# Patient Record
Sex: MALE | Race: WHITE | NOT HISPANIC OR LATINO | URBAN - METROPOLITAN AREA
[De-identification: names, ages, dates, MRNs, and addresses within clinical notes are randomized per-mention and may not be internally consistent; named-entity substitution may affect disease eponyms.]

---

## 2017-03-27 ENCOUNTER — OUTPATIENT (OUTPATIENT)
Dept: OUTPATIENT SERVICES | Facility: HOSPITAL | Age: 57
LOS: 1 days | Discharge: HOME | End: 2017-03-27

## 2017-06-27 DIAGNOSIS — R74.8 ABNORMAL LEVELS OF OTHER SERUM ENZYMES: ICD-10-CM

## 2017-06-27 DIAGNOSIS — E78.2 MIXED HYPERLIPIDEMIA: ICD-10-CM

## 2018-01-05 ENCOUNTER — OUTPATIENT (OUTPATIENT)
Dept: OUTPATIENT SERVICES | Facility: HOSPITAL | Age: 58
LOS: 1 days | Discharge: HOME | End: 2018-01-05

## 2018-01-05 DIAGNOSIS — D64.9 ANEMIA, UNSPECIFIED: ICD-10-CM

## 2018-01-05 DIAGNOSIS — N40.0 BENIGN PROSTATIC HYPERPLASIA WITHOUT LOWER URINARY TRACT SYMPTOMS: ICD-10-CM

## 2018-01-05 DIAGNOSIS — R79.9 ABNORMAL FINDING OF BLOOD CHEMISTRY, UNSPECIFIED: ICD-10-CM

## 2018-01-05 DIAGNOSIS — E11.9 TYPE 2 DIABETES MELLITUS WITHOUT COMPLICATIONS: ICD-10-CM

## 2018-01-05 DIAGNOSIS — E03.9 HYPOTHYROIDISM, UNSPECIFIED: ICD-10-CM

## 2018-01-05 DIAGNOSIS — E78.2 MIXED HYPERLIPIDEMIA: ICD-10-CM

## 2018-03-02 ENCOUNTER — OUTPATIENT (OUTPATIENT)
Dept: OUTPATIENT SERVICES | Facility: HOSPITAL | Age: 58
LOS: 1 days | Discharge: HOME | End: 2018-03-02

## 2018-03-03 DIAGNOSIS — D64.9 ANEMIA, UNSPECIFIED: ICD-10-CM

## 2018-03-03 DIAGNOSIS — E78.2 MIXED HYPERLIPIDEMIA: ICD-10-CM

## 2018-03-03 DIAGNOSIS — E11.9 TYPE 2 DIABETES MELLITUS WITHOUT COMPLICATIONS: ICD-10-CM

## 2018-05-18 ENCOUNTER — OUTPATIENT (OUTPATIENT)
Dept: OUTPATIENT SERVICES | Facility: HOSPITAL | Age: 58
LOS: 1 days | Discharge: HOME | End: 2018-05-18

## 2018-05-18 DIAGNOSIS — E11.9 TYPE 2 DIABETES MELLITUS WITHOUT COMPLICATIONS: ICD-10-CM

## 2018-06-19 ENCOUNTER — OUTPATIENT (OUTPATIENT)
Dept: OUTPATIENT SERVICES | Facility: HOSPITAL | Age: 58
LOS: 1 days | Discharge: HOME | End: 2018-06-19

## 2018-06-19 DIAGNOSIS — E78.2 MIXED HYPERLIPIDEMIA: ICD-10-CM

## 2018-06-19 DIAGNOSIS — E11.9 TYPE 2 DIABETES MELLITUS WITHOUT COMPLICATIONS: ICD-10-CM

## 2018-06-22 ENCOUNTER — OUTPATIENT (OUTPATIENT)
Dept: OUTPATIENT SERVICES | Facility: HOSPITAL | Age: 58
LOS: 1 days | Discharge: HOME | End: 2018-06-22

## 2018-06-22 DIAGNOSIS — R76.11 NONSPECIFIC REACTION TO TUBERCULIN SKIN TEST WITHOUT ACTIVE TUBERCULOSIS: ICD-10-CM

## 2018-06-22 DIAGNOSIS — I10 ESSENTIAL (PRIMARY) HYPERTENSION: ICD-10-CM

## 2018-06-22 DIAGNOSIS — K75.2 NONSPECIFIC REACTIVE HEPATITIS: ICD-10-CM

## 2018-06-22 DIAGNOSIS — Z20.2 CONTACT WITH AND (SUSPECTED) EXPOSURE TO INFECTIONS WITH A PREDOMINANTLY SEXUAL MODE OF TRANSMISSION: ICD-10-CM

## 2019-06-10 ENCOUNTER — OUTPATIENT (OUTPATIENT)
Dept: OUTPATIENT SERVICES | Facility: HOSPITAL | Age: 59
LOS: 1 days | Discharge: HOME | End: 2019-06-10
Payer: COMMERCIAL

## 2019-06-10 DIAGNOSIS — M25.511 PAIN IN RIGHT SHOULDER: ICD-10-CM

## 2019-06-10 DIAGNOSIS — M25.512 PAIN IN LEFT SHOULDER: ICD-10-CM

## 2019-06-10 PROCEDURE — 73030 X-RAY EXAM OF SHOULDER: CPT | Mod: 26,50

## 2019-07-02 ENCOUNTER — OUTPATIENT (OUTPATIENT)
Dept: OUTPATIENT SERVICES | Facility: HOSPITAL | Age: 59
LOS: 1 days | Discharge: HOME | End: 2019-07-02

## 2019-07-02 DIAGNOSIS — L40.0 PSORIASIS VULGARIS: ICD-10-CM

## 2023-08-12 ENCOUNTER — EMERGENCY (EMERGENCY)
Facility: HOSPITAL | Age: 63
LOS: 0 days | Discharge: HOME | End: 2023-08-12
Attending: EMERGENCY MEDICINE
Payer: COMMERCIAL

## 2023-08-12 VITALS
HEART RATE: 81 BPM | DIASTOLIC BLOOD PRESSURE: 95 MMHG | OXYGEN SATURATION: 99 % | WEIGHT: 225.09 LBS | RESPIRATION RATE: 16 BRPM | HEIGHT: 69 IN | TEMPERATURE: 98 F | SYSTOLIC BLOOD PRESSURE: 198 MMHG

## 2023-08-12 DIAGNOSIS — N50.811 RIGHT TESTICULAR PAIN: ICD-10-CM

## 2023-08-12 DIAGNOSIS — E78.5 HYPERLIPIDEMIA, UNSPECIFIED: ICD-10-CM

## 2023-08-12 DIAGNOSIS — N45.1 EPIDIDYMITIS: ICD-10-CM

## 2023-08-12 DIAGNOSIS — R73.03 PREDIABETES: ICD-10-CM

## 2023-08-12 LAB
ALBUMIN SERPL ELPH-MCNC: 5.2 G/DL — SIGNIFICANT CHANGE UP (ref 3.5–5.2)
ALP SERPL-CCNC: 80 U/L — SIGNIFICANT CHANGE UP (ref 30–115)
ALT FLD-CCNC: 24 U/L — SIGNIFICANT CHANGE UP (ref 0–41)
ANION GAP SERPL CALC-SCNC: 14 MMOL/L — SIGNIFICANT CHANGE UP (ref 7–14)
APPEARANCE UR: ABNORMAL
AST SERPL-CCNC: 33 U/L — SIGNIFICANT CHANGE UP (ref 0–41)
BASOPHILS # BLD AUTO: 0.08 K/UL — SIGNIFICANT CHANGE UP (ref 0–0.2)
BASOPHILS NFR BLD AUTO: 0.7 % — SIGNIFICANT CHANGE UP (ref 0–1)
BILIRUB SERPL-MCNC: 1.1 MG/DL — SIGNIFICANT CHANGE UP (ref 0.2–1.2)
BILIRUB UR-MCNC: NEGATIVE — SIGNIFICANT CHANGE UP
BUN SERPL-MCNC: 13 MG/DL — SIGNIFICANT CHANGE UP (ref 10–20)
CALCIUM SERPL-MCNC: 10.1 MG/DL — SIGNIFICANT CHANGE UP (ref 8.4–10.5)
CHLORIDE SERPL-SCNC: 100 MMOL/L — SIGNIFICANT CHANGE UP (ref 98–110)
CO2 SERPL-SCNC: 24 MMOL/L — SIGNIFICANT CHANGE UP (ref 17–32)
COLOR SPEC: YELLOW — SIGNIFICANT CHANGE UP
CREAT SERPL-MCNC: 0.8 MG/DL — SIGNIFICANT CHANGE UP (ref 0.7–1.5)
DIFF PNL FLD: ABNORMAL
EGFR: 101 ML/MIN/1.73M2 — SIGNIFICANT CHANGE UP
EOSINOPHIL # BLD AUTO: 0.18 K/UL — SIGNIFICANT CHANGE UP (ref 0–0.7)
EOSINOPHIL NFR BLD AUTO: 1.5 % — SIGNIFICANT CHANGE UP (ref 0–8)
GLUCOSE SERPL-MCNC: 114 MG/DL — HIGH (ref 70–99)
GLUCOSE UR QL: NEGATIVE MG/DL — SIGNIFICANT CHANGE UP
HCT VFR BLD CALC: 43.9 % — SIGNIFICANT CHANGE UP (ref 42–52)
HGB BLD-MCNC: 15 G/DL — SIGNIFICANT CHANGE UP (ref 14–18)
IMM GRANULOCYTES NFR BLD AUTO: 0.6 % — HIGH (ref 0.1–0.3)
KETONES UR-MCNC: ABNORMAL MG/DL
LACTATE SERPL-SCNC: 1.8 MMOL/L — SIGNIFICANT CHANGE UP (ref 0.7–2)
LEUKOCYTE ESTERASE UR-ACNC: ABNORMAL
LYMPHOCYTES # BLD AUTO: 25.7 % — SIGNIFICANT CHANGE UP (ref 20.5–51.1)
LYMPHOCYTES # BLD AUTO: 3.11 K/UL — SIGNIFICANT CHANGE UP (ref 1.2–3.4)
MCHC RBC-ENTMCNC: 32.5 PG — HIGH (ref 27–31)
MCHC RBC-ENTMCNC: 34.2 G/DL — SIGNIFICANT CHANGE UP (ref 32–37)
MCV RBC AUTO: 95.2 FL — HIGH (ref 80–94)
MONOCYTES # BLD AUTO: 1.13 K/UL — HIGH (ref 0.1–0.6)
MONOCYTES NFR BLD AUTO: 9.3 % — SIGNIFICANT CHANGE UP (ref 1.7–9.3)
NEUTROPHILS # BLD AUTO: 7.53 K/UL — HIGH (ref 1.4–6.5)
NEUTROPHILS NFR BLD AUTO: 62.2 % — SIGNIFICANT CHANGE UP (ref 42.2–75.2)
NITRITE UR-MCNC: POSITIVE
NRBC # BLD: 0 /100 WBCS — SIGNIFICANT CHANGE UP (ref 0–0)
PH UR: 6 — SIGNIFICANT CHANGE UP (ref 5–8)
PLATELET # BLD AUTO: 292 K/UL — SIGNIFICANT CHANGE UP (ref 130–400)
PMV BLD: 9.8 FL — SIGNIFICANT CHANGE UP (ref 7.4–10.4)
POTASSIUM SERPL-MCNC: 4 MMOL/L — SIGNIFICANT CHANGE UP (ref 3.5–5)
POTASSIUM SERPL-SCNC: 4 MMOL/L — SIGNIFICANT CHANGE UP (ref 3.5–5)
PROT SERPL-MCNC: 8.3 G/DL — HIGH (ref 6–8)
PROT UR-MCNC: 30 MG/DL
RBC # BLD: 4.61 M/UL — LOW (ref 4.7–6.1)
RBC # FLD: 12.4 % — SIGNIFICANT CHANGE UP (ref 11.5–14.5)
SODIUM SERPL-SCNC: 138 MMOL/L — SIGNIFICANT CHANGE UP (ref 135–146)
SP GR SPEC: 1.02 — SIGNIFICANT CHANGE UP (ref 1–1.03)
UROBILINOGEN FLD QL: 1 MG/DL — SIGNIFICANT CHANGE UP (ref 0.2–1)
WBC # BLD: 12.1 K/UL — HIGH (ref 4.8–10.8)
WBC # FLD AUTO: 12.1 K/UL — HIGH (ref 4.8–10.8)

## 2023-08-12 PROCEDURE — 87186 SC STD MICRODIL/AGAR DIL: CPT

## 2023-08-12 PROCEDURE — 81001 URINALYSIS AUTO W/SCOPE: CPT

## 2023-08-12 PROCEDURE — 87086 URINE CULTURE/COLONY COUNT: CPT

## 2023-08-12 PROCEDURE — 76870 US EXAM SCROTUM: CPT | Mod: 26

## 2023-08-12 PROCEDURE — 36415 COLL VENOUS BLD VENIPUNCTURE: CPT

## 2023-08-12 PROCEDURE — 83605 ASSAY OF LACTIC ACID: CPT

## 2023-08-12 PROCEDURE — 76870 US EXAM SCROTUM: CPT

## 2023-08-12 PROCEDURE — 99284 EMERGENCY DEPT VISIT MOD MDM: CPT

## 2023-08-12 PROCEDURE — 85025 COMPLETE CBC W/AUTO DIFF WBC: CPT

## 2023-08-12 PROCEDURE — 80053 COMPREHEN METABOLIC PANEL: CPT

## 2023-08-12 PROCEDURE — 99284 EMERGENCY DEPT VISIT MOD MDM: CPT | Mod: 25

## 2023-08-12 RX ORDER — LEVOFLOXACIN 5 MG/ML
1 INJECTION, SOLUTION INTRAVENOUS
Qty: 9 | Refills: 0
Start: 2023-08-12 | End: 2023-08-20

## 2023-08-12 NOTE — ED PROVIDER NOTE - NSFOLLOWUPCLINICS_GEN_ALL_ED_FT
[facility]  Urology  .  [place]   Phone: [phone]  Fax:

## 2023-08-12 NOTE — ED PROVIDER NOTE - PHYSICAL EXAMINATION
Physical Exam    Constitutional: No acute distress.   Eyes: Conjunctiva pink, Sclera clear, PERRLA, EOMI.  ENT: No sinus tenderness. No nasal discharge. No oropharyngeal erythema, edema, or exudates. Uvula midline.   Cardiovascular: Regular rate, regular rhythm. No noted murmurs rubs or gallops.  Respiratory: unlabored respiratory effort, clear to auscultation bilaterally no wheezing, rales or rhonchi  Gastrointestinal: Normal bowel sounds. soft, non distended, non-tender abdomen.   : Right sided testicular enlargement mild tenderness. No lesions or discharge  Musculoskeletal: supple neck, no midline tenderness. No joint or bony deformity.   Integumentary: warm, dry, no rash  Neurologic: awake, alert, cranial nerves II-XII grossly intact, extremities’ motor and sensory functions grossly intact  Psychiatric: appropriate mood, appropriate affect

## 2023-08-12 NOTE — ED PROVIDER NOTE - PATIENT PORTAL LINK FT
You can access the FollowMyHealth Patient Portal offered by [facility] by registering at the following website: [email or web address]. By joining [facility]’s FollowMyHealth portal, you will also be able to view your health information using other applications (apps) compatible with our system.

## 2023-08-12 NOTE — ED PROVIDER NOTE - NSFOLLOWUPINSTRUCTIONS_ED_ALL_ED_FT
Please take Levaquin 500mg every 24 hours for 10 days total. Please follow up with Urology in 1 week.     Our Emergency Department Referral Coordinators will be reaching out to you in the next 24-48 hours from 9:00am to 5:00pm with a follow up appointment. Please expect a phone call from the hospital in that time frame. If you do not receive a call or if you have any questions or concerns, you can reach them at [phone] or [phone].     WHAT YOU NEED TO KNOW:    What is epididymo-orchitis? Epididymo-orchitis is inflammation of your epididymis and testicle. The epididymis is a coiled tube inside your scrotum. It stores and carries sperm from your testicles to your penis. Epididymo-orchitis usually affects the epididymis and testicle on one side, but it may affect both sides.     What causes epididymo-orchitis?   •A urinary tract infection (UTI) or mumps virus infection that spreads to the epididymis   •Trauma or injury of the testes   •Urine that flows backward from your urethra to the epididymis  •Sexually transmitted infections (STIs) such as gonorrhea or Chlamydia  •Use of heart medicine called amiodarone    What are the signs and symptoms of epididymo-orchitis?   •Pain or tenderness in your scrotum, abdomen, or groin  •Redness or swelling of your scrotum  •Pain or burning during urination, or frequent urination  •Discharge from your penis or blood in your urine or semen  •Fever    How is epididymo-orchitis diagnosed? Your healthcare provider may examine your penis, prostate, and scrotum. He may ask about your symptoms and any health conditions you have. You may need any of the following tests:   •Blood and urine tests may be done to check for infection. If you have discharge, a small amount of this fluid will be tested for bacteria.   •An ultrasound uses sound waves to show pictures of your testicles on a monitor. An ultrasound may be used to check blood flow to your testicles.   •A nuclear scan checks the blood flow in your testicles. A small amount of radioactive material may be injected into your blood. The radioactive material helps your blood vessels show up better.    How is epididymo-orchitis treated? Treatment depends on the cause of your epididymo-orchitis and may include any of the following:   •Antibiotics help treat a bacterial infection.   •NSAIDs, such as ibuprofen, help decrease swelling, pain, and fever. This medicine is available with or without a doctor's order. NSAIDs can cause stomach bleeding or kidney problems in certain people. If you take blood thinner medicine, always ask if NSAIDs are safe for you. Always read the medicine label and follow directions. Do not give these medicines to children under 6 months of age without direction from your child's healthcare provider.  •Acetaminophen decreases pain and fever. It is available without a doctor's order. Ask how much to take and how often to take it. Follow directions. Acetaminophen can cause liver damage if not taken correctly.  •Prescription pain medicine may be given. Ask how to take this medicine safely.  •Surgery may be needed if your condition gets worse. Surgery to drain an abscess (collection of pus) may be needed. Surgery to remove part or all of your epididymis or testicle may also be done.     How can I manage epididymo-orchitis?   •Apply ice on your testicles for 15 to 20 minutes every hour or as directed. Use an ice pack, or put crushed ice in a plastic bag. Cover it with a towel. Ice helps prevent tissue damage and decreases swelling and pain.  •Rest in bed as directed. Elevate your scrotum when you sit or lie down to help reduce swelling and pain. You may be asked to do this by placing a rolled-up towel under your scrotum.  •Scrotal support may be recommended. An athletic supporter provides scrotal support and may make you more comfortable when you stand. Ask your healthcare provider how to use an athletic supporter.   •Do not lift heavy objects. You can make swelling worse if you lift heavy objects or strain.     When should I seek immediate care?   •You have severe pain in your testicles.  •Your symptoms become worse even after you start treatment with medicine.    When should I contact my healthcare provider?   •Your symptoms do not get better within 3 days of treatment or come back after treatment.  •You have a hot, red, tender area on your testicles.  •You have questions or concerns about your condition or care.    CARE AGREEMENT:    You have the right to help plan your care. Learn about your health condition and how it may be treated. Discuss treatment options with your healthcare providers to decide what care you want to receive. You always have the right to refuse treatment.

## 2023-08-12 NOTE — ED PROVIDER NOTE - OBJECTIVE STATEMENT
63-year-old male with past medical history of hypertriglyceridemia and prediabetes presents to the ED with testicular pain.  Patient developed atraumatic right testicular pain last night which he noticed after going to the beach.  He admits to some tenderness and foul-smelling urine for the past week but denies any genital lesions, penile discharge, dysuria, hematuria, perianal pain.  He is monogamous with his wife of many years and is not concerned about STIs. Denies fever, chills, chest pain, shortness of breath, abdominal pain, nausea, vomiting, diarrhea, constipation, lower extremity swelling, rash.

## 2023-08-12 NOTE — ED PROVIDER NOTE - NS ED ATTENDING STATEMENT MOD
This was a shared visit with the MERLENE. I reviewed and verified the documentation and independently performed the documented:

## 2023-08-12 NOTE — ED PROVIDER NOTE - ATTENDING APP SHARED VISIT CONTRIBUTION OF CARE
62 yo m with pmh of hld and predm presents with R testicular pain after coming home from the beach.  noted some malodorous urine for the past week.  pt denies trauma to the genitals.  denies dysuria or hematuria.  pt says noted some discomfort a few days at ago in the R testicle but no yenni pain.  denies h/o stds or concern for stds.  monogamous with wife.  no n/v/d, abd pain, flank pain, fever or chills.  exam: nad, ncat, perrl, eomi, mmm, rrr, ctab, abd soft, nt, nd, R testicular swelling and ttp, no rash, normal lie, normal cremasteric reflex b/l, aox3, imp: pt with R testicular pain and swelling, r/o torsion v epididymalorchitis, labs, ua, us

## 2023-08-12 NOTE — ED PROVIDER NOTE - CLINICAL SUMMARY MEDICAL DECISION MAKING FREE TEXT BOX
pt with R testicular pain and swelling, found to have epididymitis, will start on abx and f/u with uro outpt.  Pt was given strict return to ED precautions and pt indicated that he/she understood. Any ordered labs and EKG were reviewed.  Any imaging was ordered and reviewed by me.  Appropriate medications for patient's presenting complaints were ordered and effects were reassessed.  Patient's records (prior hospital, ED visit, and/or nursing home notes if available) were reviewed.  Additional history was obtained from EMS, family, and/or PCP (where available).  Escalation to admission/observation was considered.  1) However patient feels much better and is comfortable with discharge.  Appropriate follow-up was arranged.

## 2023-08-12 NOTE — ED ADULT TRIAGE NOTE - NS ED TRIAGE AVPU SCALE
PATIENT CALLED, VOICED SOME CONCERN AND FRUSTRATION.  HAD SOME ISSUES WITH VIDEO VISITS DURING FIRST PART OF PANDEMIC, THEN KEPT SPEAKING TO OFFICE STAFF AND GETTING TOLD THAT HE WOULD GET A CALL BACK FROM PROVIDER WHICH NEVER HAPPENED.  (DOESN'T LOOK LIKE ANY MESSAGES WERE LEFT)SO HE IS NOW CALLING BECAUSE HE DOESN'T KNOW WHAT TO DO ABOUT HIS HANDS.  STATES HIS STRENGTH IS NOT COMING BACK IN EITHER HAND AND DOESN'T KNOW IF THIS IS NORMAL OR IF HE NEEDS TO COME IN OR IF HE NEEDS O.T. WOULD REALLY LIKE TO SPEAK TO THE DOCTOR OR DIANA TO DISCUSS THIS TODAY.  PLEASE CALL [phone] [place]   Alert-The patient is alert, awake and responds to voice. The patient is oriented to time, place, and person. The triage nurse is able to obtain subjective information.

## 2023-08-12 NOTE — ED ADULT NURSE NOTE - NSFALLUNIVINTERV_ED_ALL_ED
Bed/Stretcher in lowest position, wheels locked, appropriate side rails in place/Call bell, personal items and telephone in reach/Instruct patient to call for assistance before getting out of bed/chair/stretcher/Non-slip footwear applied when patient is off stretcher/[place] to call system/Physically safe environment - no spills, clutter or unnecessary equipment/Purposeful proactive rounding/Room/bathroom lighting operational, light cord in reach

## 2023-08-12 NOTE — ED ADULT NURSE NOTE - ED CARDIAC CAPILLARY REFILL
"Requested Prescriptions   Pending Prescriptions Disp Refills     cyanocobalamin (CYANOCOBALAMIN) 1000 MCG/ML injection [Pharmacy Med Name: Cyanocobalamin 1000 MCG/ML Injection Solution]    Last Written Prescription Date:  02/07/2020  Last Fill Quantity: 1 mL,  # refills: 0   Last office visit: 1/24/2020 with prescribing provider:  PAVAN Fatima   Future Office Visit:     1 mL 0     Sig: INJECT 1 ML (1,000 MCG) SUBCUTANEOUSLY EVERY 30 DAYS       Vitamin Supplements (Adult) Protocol Passed - 3/7/2020 11:14 AM        Passed - High dose Vitamin D not ordered        Passed - Recent (12 mo) or future (30 days) visit within the authorizing provider's specialty     Patient has had an office visit with the authorizing provider or a provider within the authorizing providers department within the previous 12 mos or has a future within next 30 days. See \"Patient Info\" tab in inbasket, or \"Choose Columns\" in Meds & Orders section of the refill encounter.              Passed - Medication is active on med list           " 2 seconds or less

## 2024-09-21 ENCOUNTER — INPATIENT (INPATIENT)
Facility: HOSPITAL | Age: 64
LOS: 2 days | Discharge: HOME | DRG: 86 | End: 2024-09-24
Attending: SURGERY | Admitting: SURGERY
Payer: COMMERCIAL

## 2024-09-21 VITALS
HEART RATE: 95 BPM | SYSTOLIC BLOOD PRESSURE: 159 MMHG | TEMPERATURE: 99 F | RESPIRATION RATE: 18 BRPM | DIASTOLIC BLOOD PRESSURE: 88 MMHG | OXYGEN SATURATION: 100 %

## 2024-09-21 DIAGNOSIS — I60.9 NONTRAUMATIC SUBARACHNOID HEMORRHAGE, UNSPECIFIED: ICD-10-CM

## 2024-09-21 DIAGNOSIS — S02.80XA FRACTURE OF OTHER SPECIFIED SKULL AND FACIAL BONES, UNSPECIFIED SIDE, INITIAL ENCOUNTER FOR CLOSED FRACTURE: ICD-10-CM

## 2024-09-21 LAB
ALBUMIN SERPL ELPH-MCNC: 4.7 G/DL — SIGNIFICANT CHANGE UP (ref 3.5–5.2)
ALP SERPL-CCNC: 76 U/L — SIGNIFICANT CHANGE UP (ref 30–115)
ALT FLD-CCNC: 29 U/L — SIGNIFICANT CHANGE UP (ref 0–41)
ANION GAP SERPL CALC-SCNC: 18 MMOL/L — HIGH (ref 7–14)
APTT BLD: 33.1 SEC — SIGNIFICANT CHANGE UP (ref 27–39.2)
APTT BLD: 41.9 SEC — HIGH (ref 27–39.2)
AST SERPL-CCNC: 44 U/L — HIGH (ref 0–41)
B-OH-BUTYR SERPL-SCNC: 0.4 MMOL/L — SIGNIFICANT CHANGE UP
BASE EXCESS BLDV CALC-SCNC: -3.3 MMOL/L — LOW (ref -2–3)
BASE EXCESS BLDV CALC-SCNC: -3.5 MMOL/L — LOW (ref -2–3)
BASOPHILS # BLD AUTO: 0.31 K/UL — HIGH (ref 0–0.2)
BASOPHILS NFR BLD AUTO: 2.6 % — HIGH (ref 0–1)
BILIRUB SERPL-MCNC: 0.5 MG/DL — SIGNIFICANT CHANGE UP (ref 0.2–1.2)
BUN SERPL-MCNC: 12 MG/DL — SIGNIFICANT CHANGE UP (ref 10–20)
CA-I SERPL-SCNC: 1.22 MMOL/L — SIGNIFICANT CHANGE UP (ref 1.15–1.33)
CALCIUM SERPL-MCNC: 9 MG/DL — SIGNIFICANT CHANGE UP (ref 8.4–10.5)
CHLORIDE SERPL-SCNC: 100 MMOL/L — SIGNIFICANT CHANGE UP (ref 98–110)
CK SERPL-CCNC: 87 U/L — SIGNIFICANT CHANGE UP (ref 0–225)
CO2 SERPL-SCNC: 16 MMOL/L — LOW (ref 17–32)
CREAT SERPL-MCNC: 0.9 MG/DL — SIGNIFICANT CHANGE UP (ref 0.7–1.5)
EGFR: 97 ML/MIN/1.73M2 — SIGNIFICANT CHANGE UP
EOSINOPHIL # BLD AUTO: 0.72 K/UL — HIGH (ref 0–0.7)
EOSINOPHIL NFR BLD AUTO: 6.1 % — SIGNIFICANT CHANGE UP (ref 0–8)
ETHANOL SERPL-MCNC: 228 MG/DL — HIGH
GAS PNL BLDV: 132 MMOL/L — LOW (ref 136–145)
GAS PNL BLDV: 134 MMOL/L — LOW (ref 136–145)
GAS PNL BLDV: SIGNIFICANT CHANGE UP
GLUCOSE SERPL-MCNC: 191 MG/DL — HIGH (ref 70–99)
HCO3 BLDV-SCNC: 23 MMOL/L — SIGNIFICANT CHANGE UP (ref 22–29)
HCO3 BLDV-SCNC: 24 MMOL/L — SIGNIFICANT CHANGE UP (ref 22–29)
HCT VFR BLD CALC: 40.3 % — LOW (ref 42–52)
HCT VFR BLDA CALC: 41 % — SIGNIFICANT CHANGE UP (ref 39–51)
HGB BLD CALC-MCNC: 13.8 G/DL — SIGNIFICANT CHANGE UP (ref 12.6–17.4)
HGB BLD-MCNC: 13.7 G/DL — LOW (ref 14–18)
INR BLD: 1.13 RATIO — SIGNIFICANT CHANGE UP (ref 0.65–1.3)
INR BLD: 1.18 RATIO — SIGNIFICANT CHANGE UP (ref 0.65–1.3)
LACTATE BLDV-MCNC: 2.5 MMOL/L — HIGH (ref 0.5–2)
LACTATE BLDV-MCNC: 3 MMOL/L — HIGH (ref 0.5–2)
LACTATE SERPL-SCNC: 2.9 MMOL/L — HIGH (ref 0.7–2)
LIDOCAIN IGE QN: 68 U/L — HIGH (ref 7–60)
LYMPHOCYTES # BLD AUTO: 3.67 K/UL — HIGH (ref 1.2–3.4)
LYMPHOCYTES # BLD AUTO: 31.3 % — SIGNIFICANT CHANGE UP (ref 20.5–51.1)
MANUAL SMEAR VERIFICATION: SIGNIFICANT CHANGE UP
MCHC RBC-ENTMCNC: 33 PG — HIGH (ref 27–31)
MCHC RBC-ENTMCNC: 34 G/DL — SIGNIFICANT CHANGE UP (ref 32–37)
MCV RBC AUTO: 97.1 FL — HIGH (ref 80–94)
MONOCYTES # BLD AUTO: 1.22 K/UL — HIGH (ref 0.1–0.6)
MONOCYTES NFR BLD AUTO: 10.4 % — HIGH (ref 1.7–9.3)
MYELOCYTES NFR BLD: 0.9 % — HIGH (ref 0–0)
NEUTROPHILS # BLD AUTO: 5.61 K/UL — SIGNIFICANT CHANGE UP (ref 1.4–6.5)
NEUTROPHILS NFR BLD AUTO: 45.2 % — SIGNIFICANT CHANGE UP (ref 42.2–75.2)
NEUTS BAND # BLD: 2.6 % — SIGNIFICANT CHANGE UP (ref 0–6)
PCO2 BLDV: 44 MMHG — SIGNIFICANT CHANGE UP (ref 42–55)
PCO2 BLDV: 51 MMHG — SIGNIFICANT CHANGE UP (ref 42–55)
PH BLDV: 7.28 — LOW (ref 7.32–7.43)
PH BLDV: 7.32 — SIGNIFICANT CHANGE UP (ref 7.32–7.43)
PLAT MORPH BLD: NORMAL — SIGNIFICANT CHANGE UP
PLATELET # BLD AUTO: 220 K/UL — SIGNIFICANT CHANGE UP (ref 130–400)
PMV BLD: 10 FL — SIGNIFICANT CHANGE UP (ref 7.4–10.4)
PO2 BLDV: 19 MMHG — LOW (ref 25–45)
PO2 BLDV: 21 MMHG — LOW (ref 25–45)
POLYCHROMASIA BLD QL SMEAR: SLIGHT — SIGNIFICANT CHANGE UP
POTASSIUM BLDV-SCNC: 3.8 MMOL/L — SIGNIFICANT CHANGE UP (ref 3.5–5.1)
POTASSIUM SERPL-MCNC: 4.3 MMOL/L — SIGNIFICANT CHANGE UP (ref 3.5–5)
POTASSIUM SERPL-SCNC: 4.3 MMOL/L — SIGNIFICANT CHANGE UP (ref 3.5–5)
PROT SERPL-MCNC: 8 G/DL — SIGNIFICANT CHANGE UP (ref 6–8)
PROTHROM AB SERPL-ACNC: 12.9 SEC — HIGH (ref 9.95–12.87)
PROTHROM AB SERPL-ACNC: 13.5 SEC — HIGH (ref 9.95–12.87)
RBC # BLD: 4.15 M/UL — LOW (ref 4.7–6.1)
RBC # FLD: 12.5 % — SIGNIFICANT CHANGE UP (ref 11.5–14.5)
RBC BLD AUTO: ABNORMAL
SAO2 % BLDV: 25.5 % — LOW (ref 67–88)
SAO2 % BLDV: 26.6 % — LOW (ref 67–88)
SODIUM SERPL-SCNC: 134 MMOL/L — LOW (ref 135–146)
VARIANT LYMPHS # BLD: 0.9 % — SIGNIFICANT CHANGE UP (ref 0–5)
WBC # BLD: 11.74 K/UL — HIGH (ref 4.8–10.8)
WBC # FLD AUTO: 11.74 K/UL — HIGH (ref 4.8–10.8)

## 2024-09-21 PROCEDURE — 93010 ELECTROCARDIOGRAM REPORT: CPT

## 2024-09-21 PROCEDURE — 99291 CRITICAL CARE FIRST HOUR: CPT

## 2024-09-21 PROCEDURE — 70450 CT HEAD/BRAIN W/O DYE: CPT | Mod: 26,MC

## 2024-09-21 PROCEDURE — 72125 CT NECK SPINE W/O DYE: CPT | Mod: 26,MC

## 2024-09-21 PROCEDURE — 72170 X-RAY EXAM OF PELVIS: CPT | Mod: 26

## 2024-09-21 PROCEDURE — 71045 X-RAY EXAM CHEST 1 VIEW: CPT | Mod: 26

## 2024-09-21 PROCEDURE — 74177 CT ABD & PELVIS W/CONTRAST: CPT | Mod: 26,MC

## 2024-09-21 PROCEDURE — 71260 CT THORAX DX C+: CPT | Mod: 26,MC

## 2024-09-21 PROCEDURE — 70450 CT HEAD/BRAIN W/O DYE: CPT | Mod: 26,MC,77

## 2024-09-21 RX ORDER — SODIUM CHLORIDE 5 G/100ML
500 INJECTION, SOLUTION INTRAVENOUS
Refills: 0 | Status: DISCONTINUED | OUTPATIENT
Start: 2024-09-21 | End: 2024-09-21

## 2024-09-21 RX ORDER — LEVETIRACETAM 1000 MG
1000 TABLET ORAL ONCE
Refills: 0 | Status: DISCONTINUED | OUTPATIENT
Start: 2024-09-21 | End: 2024-09-21

## 2024-09-21 RX ORDER — SODIUM CHLORIDE 0.9 % (FLUSH) 0.9 %
500 SYRINGE (ML) INJECTION ONCE
Refills: 0 | Status: COMPLETED | OUTPATIENT
Start: 2024-09-21 | End: 2024-09-21

## 2024-09-21 RX ORDER — TETANUS TOXOID, REDUCED DIPHTHERIA TOXOID AND ACELLULAR PERTUSSIS VACCINE, ADSORBED 5; 2.5; 8; 8; 2.5 [IU]/.5ML; [IU]/.5ML; UG/.5ML; UG/.5ML; UG/.5ML
0.5 SUSPENSION INTRAMUSCULAR ONCE
Refills: 0 | Status: COMPLETED | OUTPATIENT
Start: 2024-09-21 | End: 2024-09-21

## 2024-09-21 RX ADMIN — TETANUS TOXOID, REDUCED DIPHTHERIA TOXOID AND ACELLULAR PERTUSSIS VACCINE, ADSORBED 0.5 MILLILITER(S): 5; 2.5; 8; 8; 2.5 SUSPENSION INTRAMUSCULAR at 19:20

## 2024-09-21 RX ADMIN — Medication 1000 MILLIGRAM(S): at 23:45

## 2024-09-21 NOTE — ED ADULT NURSE NOTE - NSFALLHARMRISKINTERV_ED_ALL_ED
Assistance OOB with selected safe patient handling equipment if applicable/Assistance with ambulation/Communicate risk of Fall with Harm to all staff, patient, and family/Monitor gait and stability/Monitor for mental status changes and reorient to person, place, and time, as needed/Provide visual cue: red socks, yellow wristband, yellow gown, etc/Reinforce activity limits and safety measures with patient and family/Toileting schedule using arm’s reach rule for commode and bathroom/Use of alarms - bed, stretcher, chair and/or video monitoring/Bed in lowest position, wheels locked, appropriate side rails in place/Call bell, personal items and telephone in reach/Instruct patient to call for assistance before getting out of bed/chair/stretcher/Non-slip footwear applied when patient is off stretcher/[place] to call system/Physically safe environment - no spills, clutter or unnecessary equipment/Purposeful Proactive Rounding/Room/bathroom lighting operational, light cord in reach

## 2024-09-21 NOTE — CONSULT NOTE ADULT - SUBJECTIVE AND OBJECTIVE BOX
TRAUMA ACTIVATION LEVEL:  CONSULT  ACTIVATED BY: ED  INTUBATED: NO      MECHANISM OF INJURY:   [] Blunt     [] MVC	  [X] Fall	  [] Pedestrian Struck	      GCS: 15 	E: 4	V: 5	M: 6    HPI:    64yM w/ PMHx of DM and HLD seen as a trauma consult s/p fall down 5 stairs in his home after drinking wine +HT, -LOC, -AC.  According to patient's wife, patient had about 2-3 glasses of wine and went down to the basement to watch TV when she heard a loud bang. She found the patient at the bottom of the stairs lying on his back. He was unable to get up on his own so EMS was called. He does not complain of any pain at this time. Trauma assessment in ED: ABCs intact , GCS 15 , AAOx3.   On admission, patient had CTH and Cspine which showed skull fracture ans SAH. Trauma activated.     PAST MEDICAL & SURGICAL HISTORY:  Diabetes    Allergies  No Known Allergies  Intolerances      Home Medications:      ROS: 10-system review is otherwise negative except HPI above.      Primary Survey:    A - airway intact  B - bilateral breath sounds and good chest rise  C - palpable pulses in all extremities  D - GCS 15 on arrival, OVALLES  Exposure obtained    Vital Signs Last 24 Hrs  T(C): 37 (21 Sep 2024 18:46), Max: 37 (21 Sep 2024 18:46)  T(F): 98.6 (21 Sep 2024 18:46), Max: 98.6 (21 Sep 2024 18:46)  HR: 95 (21 Sep 2024 18:46) (95 - 95)  BP: 159/88 (21 Sep 2024 18:46) (159/88 - 159/88)  BP(mean): --  RR: 18 (21 Sep 2024 18:46) (18 - 18)  SpO2: 100% (21 Sep 2024 18:46) (100% - 100%)        Secondary Survey:   General: NAD  HEENT: Normocephalic, atraumatic, EOMI, PEERLA. no scalp lacerations   Neck: Soft, midline trachea. no c-spine tenderness  Chest: No chest wall tenderness, no subcutaneous emphysema   Cardiac: S1, S2, RRR  Respiratory: Bilateral breath sounds, clear and equal bilaterally  Abdomen: Soft, non-distended, non-tender, no rebound, no guarding.  Groin: Normal appearing, pelvis stable   Ext:  Moving b/l upper and lower extremities. Palpable Radial b/l UE, b/l DP palpable in LE.   Back: No T/L/S spine tenderness, No palpable runoff/stepoff/deformity        ACCESS / DEVICES:  [X ] Peripheral IV    Labs:  CAPILLARY BLOOD GLUCOSE  POCT Blood Glucose.: 174 mg/dL (21 Sep 2024 18:55)               13.7   11.74 )-----------( 220      ( 21 Sep 2024 19:00 )             40.3       Auto Neutrophil %: 45.2 % (09-21-24 @ 19:00)  Band Neutrophils %: 2.6 % (09-21-24 @ 19:00)    09-21    134[L]  |  100  |  12  ----------------------------<  191[H]  4.3   |  16[L]  |  0.9      Calcium: 9.0 mg/dL (09-21-24 @ 19:00)      LFTs:             8.0  | 0.5  | 44       ------------------[76      ( 21 Sep 2024 19:00 )  4.7  | x    | 29          Lipase:68     Amylase:x         Blood Gas Venous - Lactate: 2.5 mmol/L (09-21-24 @ 21:07)  Blood Gas Venous - Lactate: 3.0 mmol/L (09-21-24 @ 20:07)  Lactate, Blood: 2.9 mmol/L (09-21-24 @ 19:00)    Coags:     13.50  ----< 1.18    ( 21 Sep 2024 20:58 )     33.1        Alcohol, Blood: 228 mg/dL (09-21-24 @ 19:00)    Urinalysis Basic - ( 21 Sep 2024 19:00 )    Color: x / Appearance: x / SG: x / pH: x  Gluc: 191 mg/dL / Ketone: x  / Bili: x / Urobili: x   Blood: x / Protein: x / Nitrite: x   Leuk Esterase: x / RBC: x / WBC x   Sq Epi: x / Non Sq Epi: x / Bacteria: x      Alcohol, Blood: 228 mg/dL (09-21-24 @ 19:00)      RADIOLOGY & ADDITIONAL STUDIES:  < from: Xray Pelvis AP only (09.21.24 @ 19:40) >  No evidence of acute fracture.    < end of copied text >  < from: CT Head No Cont (09.21.24 @ 19:46) >  CT head:  Acute, nondepressed calvarial fracture of high right parietal bone.  Acute subarachnoid hemorrhage along right inferior frontal sulci.  CT cervical spine:  No evidence of acute fracture of the cervical spine.    < end of copied text >    ---------------------------------------------------------------------------------------

## 2024-09-21 NOTE — CONSULT NOTE ADULT - SUBJECTIVE AND OBJECTIVE BOX
FRANCISCA COREY   [id number]   [birth year]  64yM    ============================   SICU Consult for     OR Stats for 09-21-24    OR Time:    IV Fluids:  EBL:  Blood Products:  UOP:  Findings-     24 Hour Events  -Admission under SICU service    ============================       HPI   Home (09-21-24 @ 18:46)    HPI:      Pertinent Imaging      [X] A ten-point review of systems was otherwise negative except as noted above.  [  ] Due to altered mental status/intubation, subjective information was not attained from the patient. History was obtained, to the extent possible, from review of the chart and collateral sources of information.    Past Medical History and Surgical History  Diabetes        Home Medications:  levoFLOXacin 500 mg oral tablet: 1 tab(s) orally once a day     Allergies  Allergies    No Known Allergies    Intolerances       Current Medications:      Vital Sings, Intake/Output (Last 24Hours)  ICU Vital Signs Last 24 Hrs  T(C): 37 (21 Sep 2024 18:46), Max: 37 (21 Sep 2024 18:46)  T(F): 98.6 (21 Sep 2024 18:46), Max: 98.6 (21 Sep 2024 18:46)  HR: 87 (21 Sep 2024 23:19) (87 - 95)  BP: 133/65 (21 Sep 2024 23:19) (133/65 - 159/88)  BP(mean): --  ABP: --  ABP(mean): --  RR: 17 (21 Sep 2024 23:19) (17 - 18)  SpO2: 99% (21 Sep 2024 23:19) (99% - 100%)    O2 Parameters below as of 21 Sep 2024 23:19  Patient On (Oxygen Delivery Method): room air          I&O's Summary      Physical Exam:  ----------------------------------------------------------------------------------------------------------   GCS:      Exam: A&Ox3, no focal deficits    RESPIRATORY:  Normal expansion/effort    CARDIOVASCULAR:  non tachycardic  No peripheral edema    GASTROINTESTINAL:  Abdomen soft, non-tender, non-distended    MUSCULOSKELETAL:  Extremities warm, pink, well-perfused.    DERM:  No skin breakdown     :   Exam: Pinzon catheter in place.     Tubes/Lines/Drains   ----------------------------------------------------------------------------------------------------------  [x] Peripheral IV  [ ] Urinary Catheter Pinzon                                               [ ] Central Venous Line                   [ ] Arterial Line		       FRANCISCA COREY   [id number]   [birth year]  64yM    ============================   SICU Consult for Q1 neurovascular checks in setting of acute intracranial bleed       24 Hour Events  -Admission under SICU service    ============================       HPI     64yM w/ PMHx of DM and HLD seen as a trauma consult s/p fall down 5 stairs in his home after drinking wine +HT, -LOC, -AC.  According to patient's wife, patient had about 2-3 glasses of wine and went down to the basement to watch TV when she heard a loud bang. She found the patient at the bottom of the stairs lying on his back. He was unable to get up on his own so EMS was called. Pt was examined at bedside, with no acute complaints at this time. Pt does not recall what happened but is awake, alert, and oriented (person, place, and time)  Denies headache, dizziness, nausea, vomiting, blurry vision.     SICU consulted for Q1 neuro checks in setting of acute intracranial bleed.     Pertinent Imaging  < from: CT Head No Cont (09.21.24 @ 19:46) >  Impression:  CT head:  Acute, nondepressed calvarial fracture of high right parietal bone.  Acute subarachnoid hemorrhage along right inferior frontal sulci.  CT cervical spine:  No evidence of acute fracture of the cervical spine.    < end of copied text >      [X] A ten-point review of systems was otherwise negative except as noted above.  [  ] Due to altered mental status/intubation, subjective information was not attained from the patient. History was obtained, to the extent possible, from review of the chart and collateral sources of information.  ====================================================================  Past Medical History and Surgical History  Diabetes  HTN      Home Medications:       Allergies  Allergies: No Known Allergies           Current Medications:      Vital Sings, Intake/Output (Last 24Hours)  ICU Vital Signs Last 24 Hrs  T(C): 37 (21 Sep 2024 18:46), Max: 37 (21 Sep 2024 18:46)  T(F): 98.6 (21 Sep 2024 18:46), Max: 98.6 (21 Sep 2024 18:46)  HR: 87 (21 Sep 2024 23:19) (87 - 95)  BP: 133/65 (21 Sep 2024 23:19) (133/65 - 159/88)  BP(mean): --  ABP: --  ABP(mean): --  RR: 17 (21 Sep 2024 23:19) (17 - 18)  SpO2: 99% (21 Sep 2024 23:19) (99% - 100%)    O2 Parameters below as of 21 Sep 2024 23:19  Patient On (Oxygen Delivery Method): room air          I&O's Summary      Physical Exam:  ----------------------------------------------------------------------------------------------------------   GCS:  15    General/Neuro: A&Ox3, EOMI, PERRLA, no focal deficits    RESPIRATORY: CTAB, Normal expansion/effort, saturating well on RA    CARDIOVASCULAR: non tachycardic, No peripheral edema    GASTROINTESTINAL:  Abdomen soft, non-tender, non-distended    MUSCULOSKELETAL:  Extremities warm, pink, well-perfused.    DERM:  No skin breakdown     : voiding    Tubes/Lines/Drains   ----------------------------------------------------------------------------------------------------------  [x] Peripheral IV  [ ] Urinary Catheter Pinzon                                               [ ] Central Venous Line                   [ ] Arterial Line		       FRANCISCA COREY   [id number]   [birth year]  64yM    ============================   SICU Consult for Q1 neurovascular checks in setting of acute intracranial bleed       24 Hour Events  -Admission under SICU service    ============================       HPI     64yM w/ PMHx of DM and HLD seen as a trauma consult s/p fall down 5 stairs in his home after drinking wine +HT, -LOC, -AC.  According to patient's wife, patient had about 2-3 glasses of wine and went down to the basement to watch TV when she heard a loud bang. She found the patient at the bottom of the stairs lying on his back. He was unable to get up on his own so EMS was called. Pt was examined at bedside, with no acute complaints at this time. Pt does not recall what happened but is awake, alert, and oriented (person, place, and time)  Denies headache, dizziness, nausea, vomiting, blurry vision.     SICU consulted for Q1 neuro checks in setting of acute intracranial bleed.     Pertinent Imaging  < from: CT Head No Cont (09.21.24 @ 19:46) >  Impression:  CT head:  Acute, nondepressed calvarial fracture of high right parietal bone.  Acute subarachnoid hemorrhage along right inferior frontal sulci.  CT cervical spine:  No evidence of acute fracture of the cervical spine.    < end of copied text >    < from: CT Head No Cont (09.21.24 @ 23:46) >  IMPRESSION:    In comparison to prior CT head examination performed on 9/21/2024 at 7:45   PM,    Increased subarachnoid hemorrhage along the right anterior frontal   convexity.  New subarachnoid hemorrhage along the left anterior frontal convexity.  New intraparenchymal hemorrhage within the bilateral anterior frontal   lobes.  New thin subdural hemorrhage along the left and probable right parietal   convexities.  Similar appearing subarachnoid hemorrhage in the right insular region.    Unchanged nondepressed high right parietal calvarial fracture.      < end of copied text >      [X] A ten-point review of systems was otherwise negative except as noted above.  [  ] Due to altered mental status/intubation, subjective information was not attained from the patient. History was obtained, to the extent possible, from review of the chart and collateral sources of information.  ====================================================================  Past Medical History and Surgical History  Diabetes  HTN      Home Medications:       Allergies  Allergies: No Known Allergies           Current Medications:      Vital Sings, Intake/Output (Last 24Hours)  ICU Vital Signs Last 24 Hrs  T(C): 37 (21 Sep 2024 18:46), Max: 37 (21 Sep 2024 18:46)  T(F): 98.6 (21 Sep 2024 18:46), Max: 98.6 (21 Sep 2024 18:46)  HR: 87 (21 Sep 2024 23:19) (87 - 95)  BP: 133/65 (21 Sep 2024 23:19) (133/65 - 159/88)  BP(mean): --  ABP: --  ABP(mean): --  RR: 17 (21 Sep 2024 23:19) (17 - 18)  SpO2: 99% (21 Sep 2024 23:19) (99% - 100%)    O2 Parameters below as of 21 Sep 2024 23:19  Patient On (Oxygen Delivery Method): room air          I&O's Summary      Physical Exam:  ----------------------------------------------------------------------------------------------------------   GCS:  15    General/Neuro: A&Ox3, EOMI, PERRLA, no focal deficits    RESPIRATORY: CTAB, Normal expansion/effort, saturating well on RA    CARDIOVASCULAR: non tachycardic, No peripheral edema    GASTROINTESTINAL:  Abdomen soft, non-tender, non-distended    MUSCULOSKELETAL:  Extremities warm, pink, well-perfused.    DERM:  No skin breakdown     : voiding    Tubes/Lines/Drains   ----------------------------------------------------------------------------------------------------------  [x] Peripheral IV  [ ] Urinary Catheter Pinzon                                               [ ] Central Venous Line                   [ ] Arterial Line		       FRANCISCA COREY   [id number]   [birth year]  64yM    ============================   SICU Consult for Q1 neurovascular checks in setting of acute intracranial bleed       24 Hour Events  -Admission under SICU service    ============================       HPI     64yM w/ PMHx of DM and HLD seen as a trauma consult s/p fall down 5 stairs in his home after drinking wine +HT, -LOC, -AC.  According to patient's wife, patient had about 2-3 glasses of wine and went down to the basement to watch TV when she heard a loud bang. She found the patient at the bottom of the stairs lying on his back. He was unable to get up on his own so EMS was called. Pt was examined at bedside, with no acute complaints at this time. Pt does not recall what happened but is awake, alert, and oriented (person, place, and time)  Denies headache, dizziness, nausea, vomiting, blurry vision.     SICU consulted for Q1 neuro checks in setting of acute intracranial bleed.     Pertinent Imaging  < from: CT Head No Cont (09.21.24 @ 19:46) >  Impression:  CT head:  Acute, nondepressed calvarial fracture of high right parietal bone.  Acute subarachnoid hemorrhage along right inferior frontal sulci.  CT cervical spine:  No evidence of acute fracture of the cervical spine.    < end of copied text >    < from: CT Head No Cont (09.21.24 @ 23:46) >  IMPRESSION:    In comparison to prior CT head examination performed on 9/21/2024 at 7:45   PM,    Increased subarachnoid hemorrhage along the right anterior frontal   convexity.  New subarachnoid hemorrhage along the left anterior frontal convexity.  New intraparenchymal hemorrhage within the bilateral anterior frontal   lobes.  New thin subdural hemorrhage along the left and probable right parietal   convexities.  Similar appearing subarachnoid hemorrhage in the right insular region.    Unchanged nondepressed high right parietal calvarial fracture.      < end of copied text >      [X] A ten-point review of systems was otherwise negative except as noted above.  [  ] Due to altered mental status/intubation, subjective information was not attained from the patient. History was obtained, to the extent possible, from review of the chart and collateral sources of information.  ====================================================================  Past Medical History and Surgical History  Diabetes  HTN      Home Medications:       Allergies  Allergies: No Known Allergies           Current Medications:      Vital Sings, Intake/Output (Last 24Hours)  ICU Vital Signs Last 24 Hrs  T(C): 37 (21 Sep 2024 18:46), Max: 37 (21 Sep 2024 18:46)  T(F): 98.6 (21 Sep 2024 18:46), Max: 98.6 (21 Sep 2024 18:46)  HR: 87 (21 Sep 2024 23:19) (87 - 95)  BP: 133/65 (21 Sep 2024 23:19) (133/65 - 159/88)  BP(mean): --  ABP: --  ABP(mean): --  RR: 17 (21 Sep 2024 23:19) (17 - 18)  SpO2: 99% (21 Sep 2024 23:19) (99% - 100%)    O2 Parameters below as of 21 Sep 2024 23:19  Patient On (Oxygen Delivery Method): room air          I&O's Summary      Physical Exam:  ----------------------------------------------------------------------------------------------------------   GCS:  15    General/Neuro: A&Ox3, EOMI, PERRLA, no focal deficits    RESPIRATORY: CTAB, Normal expansion/effort, saturating well on RA    CARDIOVASCULAR: non tachycardic, No peripheral edema    GASTROINTESTINAL:  Abdomen soft, non-tender, non-distended    MUSCULOSKELETAL:  Extremities warm, pink, well-perfused. Moving all extremities freely, motor and sensory intact    DERM:  No skin breakdown     : voiding    Tubes/Lines/Drains   ----------------------------------------------------------------------------------------------------------  [x] Peripheral IV  [ ] Urinary Catheter Pinzon                                               [ ] Central Venous Line                   [ ] Arterial Line

## 2024-09-21 NOTE — CONSULT NOTE ADULT - ASSESSMENT
ASSESSMENT:  64yM w/ PMHx of DM and HLD seen as a trauma consult s/p fall down 5 stairs in his home after drinking wine +HT, -LOC, -AC. Trauma assessment in ED: ABCs intact , GCS 15 , AAOx3,  OVALLES.     Injuries identified:   - right frontal SAH,  - right parietal skull fx   -     PLAN:   - Trauma Labs: (CBC, BMP, Coags, T&S, UA, EtOH level)  Additional studies:  EKG  Utox    Trauma Imaging to include the following:  - CXR, Pelvic Xray  - CT Head,  CT C-spine, CT Chest, CT Abd/Pelvis      Additional consultations:  - Neurosurgery  - SICU    Pending rest of panscan.   SICU consult for q1h neurochecks   Neurosurgery evaluation - repeat CTH, keppra, hold all AC and AP  Will need repeat CTH  Above plan discussed with Trauma attending, Dr. Skaggs  , patient, patient family, and ED team  --------------------------------------------------------------------------------------  09-21-24 @ 22:47

## 2024-09-21 NOTE — ED ADULT TRIAGE NOTE - CHIEF COMPLAINT QUOTE
Pt BIBEMS for fall down stairs. Family states that they don't know how many stairs he actually fell down. PT was drinking. Upon EMS arrival to hospital, put became confused and agitated. PT ripped off c-collar in ambulance and doesn't know why hes here. Laceration to left forearm and abrasion to right elbow noted

## 2024-09-21 NOTE — ED PROVIDER NOTE - CLINICAL SUMMARY MEDICAL DECISION MAKING FREE TEXT BOX
Pt signed out to me pending disposition. Patient accepted to SICU for further evaluation and care for ICH.

## 2024-09-21 NOTE — ED PROVIDER NOTE - CARE PLAN
Principal Discharge DX:	Traumatic intracranial hemorrhage  Secondary Diagnosis:	Calvarial fracture  Secondary Diagnosis:	Diverticulitis   1

## 2024-09-21 NOTE — ED PROVIDER NOTE - PROGRESS NOTE DETAILS
HARDEEP: Called by radiology with acute high right calvarial fracture with subarachnoid bleed.  Trauma and neurosurgery consults placed.  NSGY requesting neurocrit eval, placed consult. Pan scan added and negative for other acute injury.  Repeat head CT at 6 hours shows worsening of his bleed with new areas of bleeding.  Blood pressure repeated after patient returned from CT and 170s, given IV labetalol push.  Discussed with trauma as well as neurocritical care who are aware of worsening bleed.  Patient admitted to SICU.  Neurosurgery team currently in the OR.    Patient's exam is improved from my initial exam.  He is awake, alert, oriented x 3.  Pupils equally round and reactive at 2 mm.  Moving all extremities equally with good strength.

## 2024-09-21 NOTE — ED PROVIDER NOTE - ATTENDING CONTRIBUTION TO CARE
63 y/o M h/o DM presents after fall. Pt intoxicated and currently poor historian. Was in usual state of health, went to basement to relax and when he came upstairs he was confused and fell down 5 steps. Unwitnessed, unknown head strike or LOC. No vomiting. VSS. Agree with resident exam above. A&Ox1. NO focal neuro deficits. Abrasions ot the R forearm and L elbow. CTH shows SAH, calvarium fracture. Neurosurgery, neurology/neurocrit, and trauma consulted.     EKG independently interpreted by myself as NSR, LPFB, R axis, rate of 89, , QRS 96, QTc 452    CXR independently interpreted by myself as no PTX, no infiltrate, no effusions    Pending panscan. Signed out to Dr. Barron    Attending Statement:  I have personally seen the patient. I provided critical care for  a total of 35 minutes. I provided a substantive portion of the care and the majority of the critical care time.

## 2024-09-21 NOTE — CONSULT NOTE ADULT - SUBJECTIVE AND OBJECTIVE BOX
HISTORY OF PRESENT ILLNESS:   64M hx of DM no AC presents s/p fall. Pt is a poor historian and does not know why he is in the hospital. Per EMS and wife at bedside pt is usually A+O x3 and was in his usual state of health this morning. Wife states pt went into the basement to "relax and have some wine" and when he came upstairs a little while later he looked confused, and fell down 5 steps. Unknown head strike or LOC. No vomiting. Pt currently has no complaints at this time. Denies vision changes, neck pain, back pain, CP, SOB, abdominal pain, n/v, extremity pain/numbness/weakness/paresthesias. Per wife pt has no known hx of drug or alcohol abuse.    PAST MEDICAL & SURGICAL HISTORY:  Diabetes        FAMILY HISTORY:      SOCIAL HISTORY:  Tobacco Use:  EtOH use:   Substance:    Allergies    No Known Allergies    Intolerances        REVIEW OF SYSTEMS  negative for any major pulmonary, GI, cardiac, and psychiatric history other than those listed above.    MEDICATIONS:  Antibiotics:    Neuro:    Anticoagulation:    OTHER:    IVF:      Vital Signs Last 24 Hrs  T(C): 37 (21 Sep 2024 18:46), Max: 37 (21 Sep 2024 18:46)  T(F): 98.6 (21 Sep 2024 18:46), Max: 98.6 (21 Sep 2024 18:46)  HR: 95 (21 Sep 2024 18:46) (95 - 95)  BP: 159/88 (21 Sep 2024 18:46) (159/88 - 159/88)  BP(mean): --  RR: 18 (21 Sep 2024 18:46) (18 - 18)  SpO2: 100% (21 Sep 2024 18:46) (100% - 100%)        PHYSICAL EXAM:  Constitutional: A&Ox3, GCS 15  PERRL, EOMI, CN II-XII grossly intact  Neurological:  No pronator drift  No dysmetria noted  reflexes 2+ in b/l UE/LE  Negative babinski in b/l feet  Superficial sensation intact in b/l UE/LE  Motor  5/5 in b/l UE @ bicep, tricep , and deltoids  5/5 in b/l LE @ hip, knee flex-ext      LABS:                        13.7   11.74 )-----------( 220      ( 21 Sep 2024 19:00 )             40.3     09-21    134[L]  |  100  |  12  ----------------------------<  191[H]  4.3   |  16[L]  |  0.9    Ca    9.0      21 Sep 2024 19:00    TPro  8.0  /  Alb  4.7  /  TBili  0.5  /  DBili  x   /  AST  44[H]  /  ALT  29  /  AlkPhos  76  09-21    PT/INR - ( 21 Sep 2024 19:00 )   PT: 12.90 sec;   INR: 1.13 ratio         PTT - ( 21 Sep 2024 19:00 )  PTT:41.9 sec  Urinalysis Basic - ( 21 Sep 2024 19:00 )    Color: x / Appearance: x / SG: x / pH: x  Gluc: 191 mg/dL / Ketone: x  / Bili: x / Urobili: x   Blood: x / Protein: x / Nitrite: x   Leuk Esterase: x / RBC: x / WBC x   Sq Epi: x / Non Sq Epi: x / Bacteria: x      CULTURES:      RADIOLOGY & ADDITIONAL STUDIES:  CT Head wo con:   Impression:  CT head:  Acute, nondepressed calvarial fracture of high right parietal bone.  Acute subarachnoid hemorrhage along right inferior frontal sulci.  CT cervical spine:  No evidence of acute fracture of the cervical spine.    Spoke with MARLENY GAMING on 9/21/2024 8:04 PM with readback.    --- End of Report ---            FAMILIA MCCANN MD; Attending Radiologist  This document has been electronically signed. Sep 21 2024 8:06PM

## 2024-09-21 NOTE — ED PROVIDER NOTE - OBJECTIVE STATEMENT
64M hx of DM no AC presents s/p fall. Pt is a poor historian and does not know why he is in the hospital. Per EMS and wife at bedside pt is usually A+O x3 and was in his usual state of health this morning. Wife states pt went into the basement to "relax and have some wine" and when he came upstairs a little while later he looked confused, and fell down 5 steps. Unknown head strike or LOC. No vomiting. Pt currently has no complaints at this time. Denies vision changes, neck pain, back pain, CP, SOB, abdominal pain, n/v, extremity pain/numbness/weakness/paresthesias. Per wife pt has no known hx of drug or alcohol abuse.

## 2024-09-21 NOTE — CONSULT NOTE ADULT - ASSESSMENT
65 y/o male s/p fall down 5 stairs brought to [facility] and found to have a SAH and calverium fracture

## 2024-09-21 NOTE — ED ADULT NURSE NOTE - OBJECTIVE STATEMENT
65 y/o male pt came s/p fall from unknown amount of steps, not sure of LOC, was drinking alcohol earlier, not on anticoagulants, became confused and agitated prior to arrival to ED. No obvious bleeding, abrasions to the left elbow and right forearm noted.

## 2024-09-21 NOTE — ED ADULT NURSE NOTE - FINAL NURSING ELECTRONIC SIGNATURE
Per rounds, pt requesting HH. Therapy notes suggest SNF. SW to speak to pt tomorrow to discuss safest d/c option to SNF.    22-Sep-2024 02:46

## 2024-09-21 NOTE — CONSULT NOTE ADULT - PROBLEM SELECTOR RECOMMENDATION 9
1- Admit to ICU  2- Repeat CT head wo con  3- Maintain SBP less than 140  4- Neurochecks Q1hr  5- HOB Greater than 30 dergrees  6- Keppra  7- No anti-coagulation   8- Discussed with Dr Ny  9- Will follow

## 2024-09-21 NOTE — CONSULT NOTE ADULT - ASSESSMENT
64y Male w/ pmhx of     NEUROLOGICAL:  #Acute pain    -APAP prn, Gabapentin 100mg q8    -if intubated Fentanyl 12.5 mcg q4 prn    -if extubated remove all sedatives, fentanyl gtt/IV pushes      RESPIRATORY:   #Oxygenation    -wean off NC to RA as tolerated  #Activity    -increase as tolerated  #if intubated document date of intubation/ETT size and LL    CARDS:   #  Imaging:   Labs:       GASTROINTESTINAL/NUTRITION:   #  Diet, NPO (ED use only) [Active]         -if NG tube in place, document nare length and order q4 adherance    -aspiration precautions, HOB 30  #GI Prophylaxis        -not indicated  #Bowel regimen    -senna/miralax if indicated    -last bowel movement      /RENAL:   #urine output in critically ill    -indwelling puri (placed     Labs          Electrolytes-(09-21 @ 19:00)Na 134 // K 4.3 // Mg -- // Phos --          HEME/ONC:   #DVT prophylaxis     -Chemical:      -Mechanical: SCDs    -if DVT prophylaxis to be held, document and place VTE order        Labs: Hb/Hct:  13.7/40.3  (09-21 @ 19:00)  -->                      Plts:  220  -->                 PTT/INR:  41.9/1.13  --->,  33.1/1.18  --->          T&S Expires:   Blood Consent-obtain if acute anemia, q6 CBC    ID:  #  #Antibiotics Course  #DTI  WBC- 11.74  --->>  Temp trend- 24hrs T(F): 98.6 (09-21 @ 18:46), Max: 98.6 (09-21 @ 18:46)  Current antibiotics-      ENDOCRINE:    -FSG q6 if NPO or Tube feeds    -Glucose goal 140-180. if above 180 start ISS    MSK:         LINES/DRAINS:  PIV, Puri    ADVANCED DIRECTIVES:  Full Code    HCP/Emergency Contact-    INDICATION FOR SICU/SDU:        DISPO:   Case discussed with attending   64yM w/ PMHx of DM and HLD seen as a trauma consult s/p fall down 5 stairs in his home after drinking wine +HT, -LOC, -AC.  According to patient's wife, patient had about 2-3 glasses of wine and went down to the basement to watch TV when she heard a loud bang. She found the patient at the bottom of the stairs lying on his back. He was unable to get up on his own so EMS was called. Pt does not recall what happened. He does not complain of any pain at this time. Pt was evaluated by neurosurgery with recommendations for repeat head CT, Q1 neuro checks, keppra, SBP goal <140.    SICU consulted for Q1 neuro checks in setting of acute intracranial bleed.     NEUROLOGICAL:  # acute subarachnoid hemorrhage  - Q1 neurovascular checks  - repeat CTH #2 pending  - keppra 500mg bid x 7 days  - SBP goal <140  - neurosurgery recommendations appreciated    #Acute pain    -APAP ATC    - Gabapentin 100mg q8    - dilaudid 0.25mg q6h prn for moderate pain    - dilaudid 0.5mg q6h prn for moderate pain    RESPIRATORY:   #Oxygenation    -currently saturating well on room air, supplemental O2 prn    - encourage IS  #Activity    -increase as tolerated      CARDS:   #PMH HTN    - f/u home medication    - SBP goal <140     GASTROINTESTINAL/NUTRITION:   #Diet, NPOx meds     -aspiration precautions, HOB 30  #GI Prophylaxis     -not indicated  #Bowel regimen    -none     -last bowel movement prior to this admission     /RENAL:   #urine output in critically ill    -voiding freely    Labs          Electrolytes-(09-21 @ 19:00)Na 134 // K 4.3 // Mg -- // Phos --          HEME/ONC:   #DVT prophylaxis     -Chemical: holding VTE in setting of acute intracranial bleed     -Mechanical: SCDs    -if DVT prophylaxis to be held, document and place VTE order        Labs: Hb/Hct:  13.7/40.3  (09-21 @ 19:00)  -->                      Plts:  220  -->                 PTT/INR:  41.9/1.13  --->,  33.1/1.18  --->          Blood Consent-obtain if acute anemia, q6 CBC    ID:  #leukocytosis    - continue to monitor with daily labs   - monitor for fevers  #DTI   - none noted on skin assessment   WBC- 11.74  --->>  Temp trend- 24hrs T(F): 98.6 (09-21 @ 18:46), Max: 98.6 (09-21 @ 18:46)  Current antibiotics- none       ENDOCRINE:  #PMH diabetes    - holding home metformin      -FSG q6 if NPO     -Glucose goal 140-180. if above 180 start ISS    MSK:   - activity: ambulate as tolerated         LINES/DRAINS:  PIV    ADVANCED DIRECTIVES:  Full Code    HCP/Emergency Contact-    INDICATION FOR SICU: Q1 neurovascular checks in setting of acute intracranial bleed      DISPO:   Case discussed with attending Dr. Skaggs 64yM w/ PMHx of DM and HLD seen as a trauma consult s/p fall down 5 stairs in his home after drinking wine +HT, -LOC, -AC.  According to patient's wife, patient had about 2-3 glasses of wine and went down to the basement to watch TV when she heard a loud bang. She found the patient at the bottom of the stairs lying on his back. He was unable to get up on his own so EMS was called. Pt does not recall what happened. He does not complain of any pain at this time. Pt was evaluated by neurosurgery with recommendations for repeat head CT, Q1 neuro checks, keppra, SBP goal <140.    SICU consulted for Q1 neuro checks in setting of acute intracranial bleed.     NEUROLOGICAL:  # acute subarachnoid hemorrhage  - Q1 neurovascular checks  - repeat CTH #2 pending  - keppra 500mg bid x 7 days  - SBP goal <140  - neurosurgery recommendations appreciated    #Acute pain    -APAP ATC    - Gabapentin 100mg q8    - dilaudid 0.25mg q6h prn for moderate pain    - dilaudid 0.5mg q6h prn for moderate pain    RESPIRATORY:   #Oxygenation    -currently saturating well on room air, supplemental O2 prn    - encourage IS  #Activity    -increase as tolerated      CARDS:   #PMH HTN    - f/u home medication    - SBP goal <140   #PMH HLD   - f/u home medication  GASTROINTESTINAL/NUTRITION:   #Diet, NPOx meds     -aspiration precautions, HOB 30  #GI Prophylaxis     -not indicated  #Bowel regimen    -none     -last bowel movement prior to this admission     /RENAL:   #urine output in critically ill    -voiding freely    Labs          Electrolytes-(09-21 @ 19:00)Na 134 // K 4.3 // Mg -- // Phos --          HEME/ONC:   #DVT prophylaxis     -Chemical: holding VTE in setting of acute intracranial bleed     -Mechanical: SCDs    -if DVT prophylaxis to be held, document and place VTE order        Labs: Hb/Hct:  13.7/40.3  (09-21 @ 19:00)  -->                      Plts:  220  -->                 PTT/INR:  41.9/1.13  --->,  33.1/1.18  --->          Blood Consent-obtain if acute anemia, q6 CBC    ID:  #leukocytosis    - continue to monitor with daily labs   - monitor for fevers  #DTI   - none noted on skin assessment   WBC- 11.74  --->>  Temp trend- 24hrs T(F): 98.6 (09-21 @ 18:46), Max: 98.6 (09-21 @ 18:46)  Current antibiotics- none       ENDOCRINE:  #PMH diabetes    - holding home metformin      -FSG q6 if NPO     -Glucose goal 140-180. if above 180 start ISS    MSK:   - activity: ambulate as tolerated         LINES/DRAINS:  PIV    ADVANCED DIRECTIVES:  Full Code    HCP/Emergency Contact-    INDICATION FOR SICU: Q1 neurovascular checks in setting of acute intracranial bleed      DISPO:   Case discussed with attending Dr. Skaggs 64yM w/ PMHx of DM and HLD seen as a trauma consult s/p fall down 5 stairs in his home after drinking wine +HT, -LOC, -AC.  According to patient's wife, patient had about 2-3 glasses of wine and went down to the basement to watch TV when she heard a loud bang. She found the patient at the bottom of the stairs lying on his back. He was unable to get up on his own so EMS was called. Pt does not recall what happened. He does not complain of any pain at this time. Pt was evaluated by neurosurgery with recommendations for repeat head CT, Q1 neuro checks, keppra, SBP goal <140.    SICU consulted for Q1 neuro checks in setting of acute intracranial bleed.     NEUROLOGICAL:  # acute subarachnoid hemorrhage  - Q1 neurovascular checks  - repeat CTH #2: increased SAH along the right anterior frontal convexity, new SAH along the left anterior convexity, new intraparenchymal hemorrhage, new thin SDH along left and probable right parietal convexities, similar SAH in right parietal calvarial fracture. Unchanged nondepressed high right parietal calvarial fracture   - repeat CTH #3 in the AM 9/22  - keppra 750mg bid   - SBP <140  - DDVAP 32mcg x1 given  - TXA 1g x1 given    #Acute pain    -APAP 650mg ATC    - Gabapentin 100mg q8    - robaxin 500mg q8h for moderate pain     - dilaudid 0.25mg q6h prn for moderate pain    - dilaudid 0.5mg q6h prn for severe pain      RESPIRATORY   #Oxygenation    -currently saturating well on room air, supplemental O2 prn    - encourage IS  #Activity    -increase as tolerated      CARDS:   #PMH HTN    - f/u home medication    - SBP goal <140   #PMH HLD   - f/u home medication  GASTROINTESTINAL/NUTRITION:   #Diet, NPOx meds     -aspiration precautions, HOB 30  #GI Prophylaxis     -not indicated  #Bowel regimen    -none     -last bowel movement prior to this admission     /RENAL:   #urine output in critically ill    -voiding freely    Labs          Electrolytes-(09-21 @ 19:00)Na 134 // K 4.3 // Mg -- // Phos --          HEME/ONC:   #DVT prophylaxis     -Chemical: holding VTE in setting of acute intracranial bleed     -Mechanical: SCDs    -if DVT prophylaxis to be held, document and place VTE order        Labs: Hb/Hct:  13.7/40.3  (09-21 @ 19:00)  -->                      Plts:  220  -->                 PTT/INR:  41.9/1.13  --->,  33.1/1.18  --->          Blood Consent-obtain if acute anemia, q6 CBC    ID:  #monitor leukocytosis    - continue to monitor with daily labs   - monitor for fevers  #DTI   - no DTI seen on exam 9/21    WBC- 11.74  --->>  Temp trend- 24hrs T(F): 98.6 (09-21 @ 18:46), Max: 98.6 (09-21 @ 18:46)  Current antibiotics- zosyn 3.375 q8h     ENDOCRINE:  #PMH diabetes    - ISS      -FSG q6 if NPO     -Glucose goal 140-180.     - holding home metformin, glyburide, gemfibrozil    MSK:   - activity: ambulate as tolerated         LINES/DRAINS:  PIV    ADVANCED DIRECTIVES:  Full Code    HCP/Emergency Contact-    INDICATION FOR SICU: Q1 neurovascular checks in setting of acute intracranial bleed      DISPO:   Case discussed with attending Dr. Skaggs 64yM w/ PMHx of DM and HLD seen as a trauma consult s/p fall down 5 stairs in his home after drinking wine +HT, -LOC, -AC.  According to patient's wife, patient had about 2-3 glasses of wine and went down to the basement to watch TV when she heard a loud bang. She found the patient at the bottom of the stairs lying on his back. He was unable to get up on his own so EMS was called. Pt does not recall what happened. He does not complain of any pain at this time. Pt was evaluated by neurosurgery with recommendations for repeat head CT, Q1 neuro checks, keppra, SBP goal <140.    SICU consulted for Q1 neuro checks in setting of acute intracranial bleed.     NEUROLOGICAL:  # acute subarachnoid hemorrhage  - Q1 neurovascular checks  - repeat CTH #2: increased SAH along the right anterior frontal convexity, new SAH along the left anterior convexity, new intraparenchymal hemorrhage, new thin SDH along left and probable right parietal convexities, similar SAH in right parietal calvarial fracture.   - repeat CTH #3 in the AM 9/22  - keppra 750mg bid   - SBP <140  - DDVAP 32mcg x1 given  - TXA 1g x1 given      #Acute pain    -APAP 650mg ATC    - Gabapentin 100mg q8    - robaxin 500mg q8h for moderate pain     - dilaudid 0.25mg q6h prn for moderate pain    - dilaudid 0.5mg q6h prn for severe pain     #acute ETOH intoxication     - ativan 2mg IV prn for CIWA >8    -  CIWA      - Thiamine, folic acid      RESPIRATORY   #Oxygenation    -currently saturating well on room air, supplemental O2 prn    - encourage IS  #Activity    -increase as tolerated      CARDS:   #PMH HTN    - f/u home medication    - SBP goal <140   #PMH HLD   - f/u home medication  GASTROINTESTINAL/NUTRITION:   #Diet, NPOx meds     -aspiration precautions, HOB 30  #GI Prophylaxis     -not indicated  #Bowel regimen    -none     -last bowel movement prior to this admission     /RENAL:   #urine output in critically ill    -voiding freely    Labs          Electrolytes-(09-21 @ 19:00)Na 134 // K 4.3 // Mg -- // Phos --   #maintain euvolemia    - maintenance fluid NS@100cc/hr with 3% hypertonic saline @50cc/h  #hypomagnesemia   -repleted with 4g Mg       HEME/ONC:   #DVT prophylaxis     -Chemical: holding VTE in setting of acute intracranial bleed     -Mechanical: SCDs    -if DVT prophylaxis to be held, document and place VTE order        Labs: Hb/Hct:  13.7/40.3  (09-21 @ 19:00)  -->                      Plts:  220  -->                 PTT/INR:  41.9/1.13  --->,  33.1/1.18  --->          Blood Consent-obtain if acute anemia, q6 CBC    ID:  #monitor leukocytosis    - continue to monitor with daily labs   - monitor for fevers  #DTI   - no DTI seen on exam 9/21    WBC- 11.74  --->>  Temp trend- 24hrs T(F): 98.6 (09-21 @ 18:46), Max: 98.6 (09-21 @ 18:46)  Current antibiotics- zosyn 3.375 q8h     ENDOCRINE:  #PMH diabetes    - ISS      -FSG q6 if NPO     -Glucose goal 140-180.     - holding home metformin, glyburide, gemfibrozil    MSK:   - activity: ambulate as tolerated         LINES/DRAINS:  PIV    ADVANCED DIRECTIVES:  Full Code    HCP/Emergency Contact-    INDICATION FOR SICU: Q1 neurovascular checks in setting of acute intracranial bleed      DISPO:   Case discussed with attending Dr. Skaggs

## 2024-09-21 NOTE — ED PROVIDER NOTE - PHYSICAL EXAMINATION
VITAL SIGNS: I have reviewed nursing notes and confirm.  CONSTITUTIONAL: confused but in NAD  SKIN: Skin exam is warm and dry, no acute rash. Pt with abrasions to his R forearm and L elbow that do not require repair.   HEAD: Normocephalic; atraumatic. no raccoon eyes or bridges sign  EYES: PERRL, conjunctiva and sclera clear.  ENT: MMM. No oral trauma noted  NECK: Supple; non tender. No midline C spine ttp  CARD: S1, S2 normal; no murmurs, gallops, or rubs. Regular rate and rhythm. 2+ distal pulses but decreased capillary refill  RESP: Normal respiratory effort, no tachypnea or distress. Lungs CTAB, no wheezes, rales or rhonchi.  chest wall non-tender  back with no midline c/t/l/s spinal or paraspinal ttp  pelvis stable  ABD: soft, NT/ND.  EXT: Normal ROM. No clubbing, cyanosis or edema.  Neuro: Awake, alert, oriented to self but not place or time. Moving all extremities  PSYCH: Cooperative, appropriate.

## 2024-09-22 DIAGNOSIS — S06.30AA UNSPECIFIED FOCAL TRAUMATIC BRAIN INJURY WITH LOSS OF CONSCIOUSNESS STATUS UNKNOWN, INITIAL ENCOUNTER: ICD-10-CM

## 2024-09-22 LAB
ANION GAP SERPL CALC-SCNC: 13 MMOL/L — SIGNIFICANT CHANGE UP (ref 7–14)
ANION GAP SERPL CALC-SCNC: 14 MMOL/L — SIGNIFICANT CHANGE UP (ref 7–14)
ANION GAP SERPL CALC-SCNC: 18 MMOL/L — HIGH (ref 7–14)
APPEARANCE UR: ABNORMAL
APTT BLD: 31.4 SEC — SIGNIFICANT CHANGE UP (ref 27–39.2)
APTT BLD: 32.9 SEC — SIGNIFICANT CHANGE UP (ref 27–39.2)
BACTERIA # UR AUTO: ABNORMAL /HPF
BASOPHILS # BLD AUTO: 0.04 K/UL — SIGNIFICANT CHANGE UP (ref 0–0.2)
BASOPHILS NFR BLD AUTO: 0.4 % — SIGNIFICANT CHANGE UP (ref 0–1)
BILIRUB UR-MCNC: NEGATIVE — SIGNIFICANT CHANGE UP
BLD GP AB SCN SERPL QL: SIGNIFICANT CHANGE UP
BUN SERPL-MCNC: 9 MG/DL — LOW (ref 10–20)
CALCIUM SERPL-MCNC: 8.6 MG/DL — SIGNIFICANT CHANGE UP (ref 8.4–10.5)
CALCIUM SERPL-MCNC: 8.8 MG/DL — SIGNIFICANT CHANGE UP (ref 8.4–10.4)
CALCIUM SERPL-MCNC: 8.8 MG/DL — SIGNIFICANT CHANGE UP (ref 8.4–10.5)
CAST: 0 /LPF — SIGNIFICANT CHANGE UP (ref 0–4)
CHLORIDE SERPL-SCNC: 100 MMOL/L — SIGNIFICANT CHANGE UP (ref 98–110)
CHLORIDE SERPL-SCNC: 104 MMOL/L — SIGNIFICANT CHANGE UP (ref 98–110)
CHLORIDE SERPL-SCNC: 105 MMOL/L — SIGNIFICANT CHANGE UP (ref 98–110)
CO2 SERPL-SCNC: 19 MMOL/L — SIGNIFICANT CHANGE UP (ref 17–32)
CO2 SERPL-SCNC: 20 MMOL/L — SIGNIFICANT CHANGE UP (ref 17–32)
CO2 SERPL-SCNC: 20 MMOL/L — SIGNIFICANT CHANGE UP (ref 17–32)
COLOR SPEC: YELLOW — SIGNIFICANT CHANGE UP
CREAT SERPL-MCNC: 0.7 MG/DL — SIGNIFICANT CHANGE UP (ref 0.7–1.5)
CREAT SERPL-MCNC: 0.7 MG/DL — SIGNIFICANT CHANGE UP (ref 0.7–1.5)
CREAT SERPL-MCNC: 0.8 MG/DL — SIGNIFICANT CHANGE UP (ref 0.7–1.5)
D DIMER BLD IA.RAPID-MCNC: 447 NG/ML DDU — HIGH
DIFF PNL FLD: NEGATIVE — SIGNIFICANT CHANGE UP
EGFR: 100 ML/MIN/1.73M2 — SIGNIFICANT CHANGE UP
EGFR: 104 ML/MIN/1.73M2 — SIGNIFICANT CHANGE UP
EGFR: 104 ML/MIN/1.73M2 — SIGNIFICANT CHANGE UP
EOSINOPHIL # BLD AUTO: 0.06 K/UL — SIGNIFICANT CHANGE UP (ref 0–0.7)
EOSINOPHIL NFR BLD AUTO: 0.6 % — SIGNIFICANT CHANGE UP (ref 0–8)
FIBRINOGEN PPP-MCNC: 493 MG/DL — HIGH (ref 200–435)
GLUCOSE BLDC GLUCOMTR-MCNC: 150 MG/DL — HIGH (ref 70–99)
GLUCOSE BLDC GLUCOMTR-MCNC: 150 MG/DL — HIGH (ref 70–99)
GLUCOSE BLDC GLUCOMTR-MCNC: 161 MG/DL — HIGH (ref 70–99)
GLUCOSE BLDC GLUCOMTR-MCNC: 164 MG/DL — HIGH (ref 70–99)
GLUCOSE BLDC GLUCOMTR-MCNC: 178 MG/DL — HIGH (ref 70–99)
GLUCOSE SERPL-MCNC: 158 MG/DL — HIGH (ref 70–99)
GLUCOSE SERPL-MCNC: 159 MG/DL — HIGH (ref 70–99)
GLUCOSE SERPL-MCNC: 174 MG/DL — HIGH (ref 70–99)
GLUCOSE UR QL: NEGATIVE MG/DL — SIGNIFICANT CHANGE UP
HCT VFR BLD CALC: 37.7 % — LOW (ref 42–52)
HCT VFR BLD CALC: 37.8 % — LOW (ref 42–52)
HGB BLD-MCNC: 12.5 G/DL — LOW (ref 14–18)
HGB BLD-MCNC: 12.6 G/DL — LOW (ref 14–18)
IMM GRANULOCYTES NFR BLD AUTO: 0.6 % — HIGH (ref 0.1–0.3)
INR BLD: 1.18 RATIO — SIGNIFICANT CHANGE UP (ref 0.65–1.3)
INR BLD: 1.22 RATIO — SIGNIFICANT CHANGE UP (ref 0.65–1.3)
KETONES UR-MCNC: NEGATIVE MG/DL — SIGNIFICANT CHANGE UP
LACTATE SERPL-SCNC: 1.3 MMOL/L — SIGNIFICANT CHANGE UP (ref 0.7–2)
LEUKOCYTE ESTERASE UR-ACNC: ABNORMAL
LYMPHOCYTES # BLD AUTO: 1.79 K/UL — SIGNIFICANT CHANGE UP (ref 1.2–3.4)
LYMPHOCYTES # BLD AUTO: 17.8 % — LOW (ref 20.5–51.1)
MAGNESIUM SERPL-MCNC: 1.4 MG/DL — LOW (ref 1.8–2.4)
MCHC RBC-ENTMCNC: 32.1 PG — HIGH (ref 27–31)
MCHC RBC-ENTMCNC: 32.6 PG — HIGH (ref 27–31)
MCHC RBC-ENTMCNC: 33.1 G/DL — SIGNIFICANT CHANGE UP (ref 32–37)
MCHC RBC-ENTMCNC: 33.4 G/DL — SIGNIFICANT CHANGE UP (ref 32–37)
MCV RBC AUTO: 96.2 FL — HIGH (ref 80–94)
MCV RBC AUTO: 98.7 FL — HIGH (ref 80–94)
MONOCYTES # BLD AUTO: 1.18 K/UL — HIGH (ref 0.1–0.6)
MONOCYTES NFR BLD AUTO: 11.7 % — HIGH (ref 1.7–9.3)
MRSA PCR RESULT.: NEGATIVE — SIGNIFICANT CHANGE UP
NEUTROPHILS # BLD AUTO: 6.95 K/UL — HIGH (ref 1.4–6.5)
NEUTROPHILS NFR BLD AUTO: 68.9 % — SIGNIFICANT CHANGE UP (ref 42.2–75.2)
NITRITE UR-MCNC: NEGATIVE — SIGNIFICANT CHANGE UP
NRBC # BLD: 0 /100 WBCS — SIGNIFICANT CHANGE UP (ref 0–0)
NRBC # BLD: 0 /100 WBCS — SIGNIFICANT CHANGE UP (ref 0–0)
OSMOLALITY SERPL: 289 MOS/KG — SIGNIFICANT CHANGE UP (ref 280–301)
OSMOLALITY SERPL: 291 MOS/KG — SIGNIFICANT CHANGE UP (ref 280–301)
OSMOLALITY SERPL: 296 MOS/KG — SIGNIFICANT CHANGE UP (ref 280–301)
PH UR: 6 — SIGNIFICANT CHANGE UP (ref 5–8)
PHOSPHATE SERPL-MCNC: 3.8 MG/DL — SIGNIFICANT CHANGE UP (ref 2.1–4.9)
PLATELET # BLD AUTO: 206 K/UL — SIGNIFICANT CHANGE UP (ref 130–400)
PLATELET # BLD AUTO: 209 K/UL — SIGNIFICANT CHANGE UP (ref 130–400)
PMV BLD: 10 FL — SIGNIFICANT CHANGE UP (ref 7.4–10.4)
PMV BLD: 9.7 FL — SIGNIFICANT CHANGE UP (ref 7.4–10.4)
POTASSIUM SERPL-MCNC: 3.9 MMOL/L — SIGNIFICANT CHANGE UP (ref 3.5–5)
POTASSIUM SERPL-MCNC: 4.3 MMOL/L — SIGNIFICANT CHANGE UP (ref 3.5–5)
POTASSIUM SERPL-MCNC: 4.4 MMOL/L — SIGNIFICANT CHANGE UP (ref 3.5–5)
POTASSIUM SERPL-SCNC: 3.9 MMOL/L — SIGNIFICANT CHANGE UP (ref 3.5–5)
POTASSIUM SERPL-SCNC: 4.3 MMOL/L — SIGNIFICANT CHANGE UP (ref 3.5–5)
POTASSIUM SERPL-SCNC: 4.4 MMOL/L — SIGNIFICANT CHANGE UP (ref 3.5–5)
PROT UR-MCNC: 30 MG/DL
PROTHROM AB SERPL-ACNC: 13.5 SEC — HIGH (ref 9.95–12.87)
PROTHROM AB SERPL-ACNC: 13.9 SEC — HIGH (ref 9.95–12.87)
RBC # BLD: 3.83 M/UL — LOW (ref 4.7–6.1)
RBC # BLD: 3.92 M/UL — LOW (ref 4.7–6.1)
RBC # FLD: 12.4 % — SIGNIFICANT CHANGE UP (ref 11.5–14.5)
RBC # FLD: 12.5 % — SIGNIFICANT CHANGE UP (ref 11.5–14.5)
RBC CASTS # UR COMP ASSIST: 1 /HPF — SIGNIFICANT CHANGE UP (ref 0–4)
SODIUM SERPL-SCNC: 133 MMOL/L — LOW (ref 135–146)
SODIUM SERPL-SCNC: 138 MMOL/L — SIGNIFICANT CHANGE UP (ref 135–146)
SODIUM SERPL-SCNC: 142 MMOL/L — SIGNIFICANT CHANGE UP (ref 135–146)
SP GR SPEC: >1.03 — HIGH (ref 1–1.03)
SQUAMOUS # UR AUTO: 0 /HPF — SIGNIFICANT CHANGE UP (ref 0–5)
UROBILINOGEN FLD QL: 0.2 MG/DL — SIGNIFICANT CHANGE UP (ref 0.2–1)
WBC # BLD: 10.08 K/UL — SIGNIFICANT CHANGE UP (ref 4.8–10.8)
WBC # BLD: 9.36 K/UL — SIGNIFICANT CHANGE UP (ref 4.8–10.8)
WBC # FLD AUTO: 10.08 K/UL — SIGNIFICANT CHANGE UP (ref 4.8–10.8)
WBC # FLD AUTO: 9.36 K/UL — SIGNIFICANT CHANGE UP (ref 4.8–10.8)
WBC UR QL: 181 /HPF — HIGH (ref 0–5)

## 2024-09-22 PROCEDURE — 85730 THROMBOPLASTIN TIME PARTIAL: CPT

## 2024-09-22 PROCEDURE — 86850 RBC ANTIBODY SCREEN: CPT

## 2024-09-22 PROCEDURE — 83735 ASSAY OF MAGNESIUM: CPT

## 2024-09-22 PROCEDURE — 94010 BREATHING CAPACITY TEST: CPT

## 2024-09-22 PROCEDURE — 70450 CT HEAD/BRAIN W/O DYE: CPT | Mod: MC

## 2024-09-22 PROCEDURE — 99291 CRITICAL CARE FIRST HOUR: CPT

## 2024-09-22 PROCEDURE — 86901 BLOOD TYPING SEROLOGIC RH(D): CPT

## 2024-09-22 PROCEDURE — 85379 FIBRIN DEGRADATION QUANT: CPT

## 2024-09-22 PROCEDURE — 87640 STAPH A DNA AMP PROBE: CPT

## 2024-09-22 PROCEDURE — 85610 PROTHROMBIN TIME: CPT

## 2024-09-22 PROCEDURE — 83930 ASSAY OF BLOOD OSMOLALITY: CPT

## 2024-09-22 PROCEDURE — 87641 MR-STAPH DNA AMP PROBE: CPT

## 2024-09-22 PROCEDURE — 70450 CT HEAD/BRAIN W/O DYE: CPT | Mod: 26

## 2024-09-22 PROCEDURE — 83036 HEMOGLOBIN GLYCOSYLATED A1C: CPT

## 2024-09-22 PROCEDURE — 84100 ASSAY OF PHOSPHORUS: CPT

## 2024-09-22 PROCEDURE — 97162 PT EVAL MOD COMPLEX 30 MIN: CPT | Mod: GP

## 2024-09-22 PROCEDURE — 80048 BASIC METABOLIC PNL TOTAL CA: CPT

## 2024-09-22 PROCEDURE — 81001 URINALYSIS AUTO W/SCOPE: CPT

## 2024-09-22 PROCEDURE — 82962 GLUCOSE BLOOD TEST: CPT

## 2024-09-22 PROCEDURE — 85025 COMPLETE CBC W/AUTO DIFF WBC: CPT

## 2024-09-22 PROCEDURE — 36415 COLL VENOUS BLD VENIPUNCTURE: CPT

## 2024-09-22 PROCEDURE — 86900 BLOOD TYPING SEROLOGIC ABO: CPT

## 2024-09-22 PROCEDURE — 85027 COMPLETE CBC AUTOMATED: CPT

## 2024-09-22 PROCEDURE — 83605 ASSAY OF LACTIC ACID: CPT

## 2024-09-22 PROCEDURE — 85384 FIBRINOGEN ACTIVITY: CPT

## 2024-09-22 PROCEDURE — 99291 CRITICAL CARE FIRST HOUR: CPT | Mod: GC

## 2024-09-22 RX ORDER — SODIUM CHLORIDE IRRIG SOLUTION 0.9 %
1000 SOLUTION, IRRIGATION IRRIGATION
Refills: 0 | Status: DISCONTINUED | OUTPATIENT
Start: 2024-09-22 | End: 2024-09-22

## 2024-09-22 RX ORDER — AMLODIPINE BESYLATE 5 MG
5 TABLET ORAL DAILY
Refills: 0 | Status: DISCONTINUED | OUTPATIENT
Start: 2024-09-22 | End: 2024-09-22

## 2024-09-22 RX ORDER — FOLIC ACID 1 MG/1
1 TABLET ORAL DAILY
Refills: 0 | Status: COMPLETED | OUTPATIENT
Start: 2024-09-22 | End: 2024-09-24

## 2024-09-22 RX ORDER — ALCOHOL ANTISEPTIC PADS
15 PADS, MEDICATED (EA) TOPICAL ONCE
Refills: 0 | Status: DISCONTINUED | OUTPATIENT
Start: 2024-09-22 | End: 2024-09-22

## 2024-09-22 RX ORDER — ALCOHOL ANTISEPTIC PADS
25 PADS, MEDICATED (EA) TOPICAL ONCE
Refills: 0 | Status: DISCONTINUED | OUTPATIENT
Start: 2024-09-22 | End: 2024-09-22

## 2024-09-22 RX ORDER — INFLUENZA VIRUS VACCINE 15; 15; 15; 15 UG/.5ML; UG/.5ML; UG/.5ML; UG/.5ML
0.5 SUSPENSION INTRAMUSCULAR ONCE
Refills: 0 | Status: DISCONTINUED | OUTPATIENT
Start: 2024-09-22 | End: 2024-09-24

## 2024-09-22 RX ORDER — LEVETIRACETAM 1000 MG
750 TABLET ORAL
Refills: 0 | Status: DISCONTINUED | OUTPATIENT
Start: 2024-09-22 | End: 2024-09-23

## 2024-09-22 RX ORDER — SODIUM CHLORIDE 5 G/100ML
500 INJECTION, SOLUTION INTRAVENOUS
Refills: 0 | Status: DISCONTINUED | OUTPATIENT
Start: 2024-09-22 | End: 2024-09-22

## 2024-09-22 RX ORDER — HYDRALAZINE HYDROCHLORIDE 100 MG/1
10 TABLET ORAL ONCE
Refills: 0 | Status: COMPLETED | OUTPATIENT
Start: 2024-09-22 | End: 2024-09-22

## 2024-09-22 RX ORDER — GLUCAGON INJECTION, SOLUTION 0.5 MG/.1ML
1 INJECTION, SOLUTION SUBCUTANEOUS ONCE
Refills: 0 | Status: DISCONTINUED | OUTPATIENT
Start: 2024-09-22 | End: 2024-09-22

## 2024-09-22 RX ORDER — ENOXAPARIN SODIUM 150 MG/ML
40 INJECTION SUBCUTANEOUS EVERY 24 HOURS
Refills: 0 | Status: DISCONTINUED | OUTPATIENT
Start: 2024-09-22 | End: 2024-09-22

## 2024-09-22 RX ORDER — ONDANSETRON HCL/PF 4 MG/2 ML
4 VIAL (ML) INJECTION EVERY 6 HOURS
Refills: 0 | Status: DISCONTINUED | OUTPATIENT
Start: 2024-09-22 | End: 2024-09-24

## 2024-09-22 RX ORDER — METFORMIN HCL 500 MG
1 TABLET ORAL
Refills: 0 | DISCHARGE

## 2024-09-22 RX ORDER — THIAMINE HYDROCHLORIDE 100 MG/ML
100 INJECTION, SOLUTION INTRAMUSCULAR; INTRAVENOUS DAILY
Refills: 0 | Status: COMPLETED | OUTPATIENT
Start: 2024-09-22 | End: 2024-09-24

## 2024-09-22 RX ORDER — INSULIN LISPRO 100/ML
VIAL (ML) SUBCUTANEOUS
Refills: 0 | Status: DISCONTINUED | OUTPATIENT
Start: 2024-09-22 | End: 2024-09-24

## 2024-09-22 RX ORDER — METOPROLOL TARTRATE 50 MG
5 TABLET ORAL ONCE
Refills: 0 | Status: DISCONTINUED | OUTPATIENT
Start: 2024-09-22 | End: 2024-09-22

## 2024-09-22 RX ORDER — MAGNESIUM SULFATE 500 MG/ML
2 VIAL (ML) INJECTION ONCE
Refills: 0 | Status: COMPLETED | OUTPATIENT
Start: 2024-09-22 | End: 2024-09-22

## 2024-09-22 RX ORDER — GABAPENTIN 800 MG/1
100 TABLET, FILM COATED ORAL THREE TIMES A DAY
Refills: 0 | Status: DISCONTINUED | OUTPATIENT
Start: 2024-09-22 | End: 2024-09-22

## 2024-09-22 RX ORDER — DESMOPRESSIN ACETATE 4 UG/ML
32 INJECTION, SOLUTION INTRAVENOUS; SUBCUTANEOUS ONCE
Refills: 0 | Status: COMPLETED | OUTPATIENT
Start: 2024-09-22 | End: 2024-09-22

## 2024-09-22 RX ORDER — INSULIN LISPRO 100/ML
VIAL (ML) SUBCUTANEOUS
Refills: 0 | Status: DISCONTINUED | OUTPATIENT
Start: 2024-09-22 | End: 2024-09-22

## 2024-09-22 RX ORDER — ALCOHOL ANTISEPTIC PADS
12.5 PADS, MEDICATED (EA) TOPICAL ONCE
Refills: 0 | Status: DISCONTINUED | OUTPATIENT
Start: 2024-09-22 | End: 2024-09-22

## 2024-09-22 RX ORDER — CEFTRIAXONE SODIUM 1 G
1000 VIAL (EA) INJECTION EVERY 24 HOURS
Refills: 0 | Status: COMPLETED | OUTPATIENT
Start: 2024-09-22 | End: 2024-09-24

## 2024-09-22 RX ORDER — CHLORHEXIDINE GLUCONATE ORAL RINSE 1.2 MG/ML
1 SOLUTION DENTAL
Refills: 0 | Status: DISCONTINUED | OUTPATIENT
Start: 2024-09-22 | End: 2024-09-24

## 2024-09-22 RX ORDER — GEMFIBROZIL 600 MG/1
1 TABLET, FILM COATED ORAL
Refills: 0 | DISCHARGE

## 2024-09-22 RX ORDER — AMLODIPINE BESYLATE 5 MG
5 TABLET ORAL ONCE
Refills: 0 | Status: COMPLETED | OUTPATIENT
Start: 2024-09-22 | End: 2024-09-22

## 2024-09-22 RX ORDER — ACETAMINOPHEN 325 MG
650 TABLET ORAL EVERY 6 HOURS
Refills: 0 | Status: DISCONTINUED | OUTPATIENT
Start: 2024-09-22 | End: 2024-09-24

## 2024-09-22 RX ORDER — OXYCODONE HYDROCHLORIDE 30 MG/1
5 TABLET, FILM COATED, EXTENDED RELEASE ORAL EVERY 6 HOURS
Refills: 0 | Status: DISCONTINUED | OUTPATIENT
Start: 2024-09-22 | End: 2024-09-22

## 2024-09-22 RX ORDER — INSULIN LISPRO 100/ML
VIAL (ML) SUBCUTANEOUS EVERY 6 HOURS
Refills: 0 | Status: DISCONTINUED | OUTPATIENT
Start: 2024-09-22 | End: 2024-09-22

## 2024-09-22 RX ORDER — HYDRALAZINE HYDROCHLORIDE 100 MG/1
5 TABLET ORAL ONCE
Refills: 0 | Status: COMPLETED | OUTPATIENT
Start: 2024-09-22 | End: 2024-09-22

## 2024-09-22 RX ORDER — AMLODIPINE BESYLATE 5 MG
5 TABLET ORAL DAILY
Refills: 0 | Status: DISCONTINUED | OUTPATIENT
Start: 2024-09-23 | End: 2024-09-24

## 2024-09-22 RX ORDER — SODIUM CHLORIDE 0.9 % (FLUSH) 0.9 %
1000 SYRINGE (ML) INJECTION
Refills: 0 | Status: DISCONTINUED | OUTPATIENT
Start: 2024-09-22 | End: 2024-09-22

## 2024-09-22 RX ORDER — GLYBURIDE 2.5 MG/1
1 TABLET ORAL
Refills: 0 | DISCHARGE

## 2024-09-22 RX ORDER — LEVETIRACETAM 1000 MG
750 TABLET ORAL
Refills: 0 | Status: DISCONTINUED | OUTPATIENT
Start: 2024-09-22 | End: 2024-09-22

## 2024-09-22 RX ORDER — HYDROMORPHONE HYDROCHLORIDE 1 MG/ML
0.5 INJECTION, SOLUTION INTRAMUSCULAR; INTRAVENOUS; SUBCUTANEOUS EVERY 4 HOURS
Refills: 0 | Status: DISCONTINUED | OUTPATIENT
Start: 2024-09-22 | End: 2024-09-22

## 2024-09-22 RX ORDER — MULTI VITAMIN/MINERAL SUPPLEMENT WITH ASCORBIC ACID, NIACIN, PYRIDOXINE, PANTOTHENIC ACID, FOLIC ACID, RIBOFLAVIN, THIAMIN, BIOTIN, COBALAMIN AND ZINC. 60; 20; 12.5; 10; 10; 1.7; 1.5; 1; .3; .006 MG/1; MG/1; MG/1; MG/1; MG/1; MG/1; MG/1; MG/1; MG/1; MG/1
1 TABLET, COATED ORAL DAILY
Refills: 0 | Status: DISCONTINUED | OUTPATIENT
Start: 2024-09-22 | End: 2024-09-22

## 2024-09-22 RX ORDER — LABETALOL HYDROCHLORIDE 200 MG/1
10 TABLET, FILM COATED ORAL ONCE
Refills: 0 | Status: COMPLETED | OUTPATIENT
Start: 2024-09-22 | End: 2024-09-22

## 2024-09-22 RX ORDER — PIPERACILLIN SODIUM AND TAZOBACTAM SODIUM 12; 1.5 G/60ML; G/60ML
3.38 INJECTION, POWDER, LYOPHILIZED, FOR SOLUTION INTRAVENOUS EVERY 8 HOURS
Refills: 0 | Status: DISCONTINUED | OUTPATIENT
Start: 2024-09-22 | End: 2024-09-22

## 2024-09-22 RX ORDER — HYDROMORPHONE HYDROCHLORIDE 1 MG/ML
0.25 INJECTION, SOLUTION INTRAMUSCULAR; INTRAVENOUS; SUBCUTANEOUS EVERY 4 HOURS
Refills: 0 | Status: DISCONTINUED | OUTPATIENT
Start: 2024-09-22 | End: 2024-09-22

## 2024-09-22 RX ADMIN — Medication 1: at 05:53

## 2024-09-22 RX ADMIN — HYDRALAZINE HYDROCHLORIDE 5 MILLIGRAM(S): 100 TABLET ORAL at 12:29

## 2024-09-22 RX ADMIN — DESMOPRESSIN ACETATE 174 MICROGRAM(S): 4 INJECTION, SOLUTION INTRAVENOUS; SUBCUTANEOUS at 02:49

## 2024-09-22 RX ADMIN — THIAMINE HYDROCHLORIDE 100 MILLIGRAM(S): 100 INJECTION, SOLUTION INTRAMUSCULAR; INTRAVENOUS at 11:48

## 2024-09-22 RX ADMIN — Medication 750 MILLIGRAM(S): at 17:31

## 2024-09-22 RX ADMIN — Medication 220 MILLIGRAM(S): at 02:49

## 2024-09-22 RX ADMIN — Medication 25 GRAM(S): at 05:50

## 2024-09-22 RX ADMIN — Medication 650 MILLIGRAM(S): at 06:00

## 2024-09-22 RX ADMIN — ENOXAPARIN SODIUM 40 MILLIGRAM(S): 150 INJECTION SUBCUTANEOUS at 19:00

## 2024-09-22 RX ADMIN — Medication 650 MILLIGRAM(S): at 17:31

## 2024-09-22 RX ADMIN — Medication 2: at 17:30

## 2024-09-22 RX ADMIN — Medication 750 MILLIGRAM(S): at 11:48

## 2024-09-22 RX ADMIN — Medication 100 MILLILITER(S): at 01:47

## 2024-09-22 RX ADMIN — FOLIC ACID 1 MILLIGRAM(S): 1 TABLET ORAL at 11:48

## 2024-09-22 RX ADMIN — CHLORHEXIDINE GLUCONATE ORAL RINSE 1 APPLICATION(S): 1.2 SOLUTION DENTAL at 05:53

## 2024-09-22 RX ADMIN — Medication 5 MILLIGRAM(S): at 19:01

## 2024-09-22 RX ADMIN — PIPERACILLIN SODIUM AND TAZOBACTAM SODIUM 25 GRAM(S): 12; 1.5 INJECTION, POWDER, LYOPHILIZED, FOR SOLUTION INTRAVENOUS at 05:53

## 2024-09-22 RX ADMIN — Medication 650 MILLIGRAM(S): at 18:55

## 2024-09-22 RX ADMIN — Medication 25 GRAM(S): at 08:12

## 2024-09-22 RX ADMIN — SODIUM CHLORIDE 50 MILLILITER(S): 5 INJECTION, SOLUTION INTRAVENOUS at 01:37

## 2024-09-22 RX ADMIN — HYDRALAZINE HYDROCHLORIDE 10 MILLIGRAM(S): 100 TABLET ORAL at 16:05

## 2024-09-22 RX ADMIN — GABAPENTIN 100 MILLIGRAM(S): 800 TABLET, FILM COATED ORAL at 05:53

## 2024-09-22 RX ADMIN — Medication 100 MILLIGRAM(S): at 11:48

## 2024-09-22 RX ADMIN — LABETALOL HYDROCHLORIDE 10 MILLIGRAM(S): 200 TABLET, FILM COATED ORAL at 00:32

## 2024-09-22 RX ADMIN — Medication 1: at 11:48

## 2024-09-22 RX ADMIN — Medication 650 MILLIGRAM(S): at 06:45

## 2024-09-22 RX ADMIN — Medication 500 MILLIGRAM(S): at 21:09

## 2024-09-22 NOTE — CONSULT NOTE ADULT - ATTENDING COMMENTS
Please see my H&P
traumatic SAH/SDH/IPH with nondepressed skull fx, in setting of acute alcoholic intake, GCS 15    q1 neuro  seizure ppx with keppra  TXA 1g IVPB x1 over 10 min; 0.3 mcg/kg of DDAVP IVPB x1 over 20 min  interval CTH 4-6 hours from previous for stability  hold chemoppx at this time  goal Na 140-150 within next 48 hours  nsgy consultation    remainder of care per primary team
Please see my H&P

## 2024-09-22 NOTE — H&P ADULT - NSHPLABSRESULTS_GEN_ALL_CORE
Labs:  CAPILLARY BLOOD GLUCOSE  POCT Blood Glucose.: 174 mg/dL (21 Sep 2024 18:55)               13.7   11.74 )-----------( 220      ( 21 Sep 2024 19:00 )             40.3       Auto Neutrophil %: 45.2 % (09-21-24 @ 19:00)  Band Neutrophils %: 2.6 % (09-21-24 @ 19:00)    09-21    134[L]  |  100  |  12  ----------------------------<  191[H]  4.3   |  16[L]  |  0.9      Calcium: 9.0 mg/dL (09-21-24 @ 19:00)      LFTs:             8.0  | 0.5  | 44       ------------------[76      ( 21 Sep 2024 19:00 )  4.7  | x    | 29          Lipase:68     Amylase:x         Blood Gas Venous - Lactate: 2.5 mmol/L (09-21-24 @ 21:07)  Blood Gas Venous - Lactate: 3.0 mmol/L (09-21-24 @ 20:07)  Lactate, Blood: 2.9 mmol/L (09-21-24 @ 19:00)    Coags:     13.50  ----< 1.18    ( 21 Sep 2024 20:58 )     33.1        Alcohol, Blood: 228 mg/dL (09-21-24 @ 19:00)    Urinalysis Basic - ( 21 Sep 2024 19:00 )    Color: x / Appearance: x / SG: x / pH: x  Gluc: 191 mg/dL / Ketone: x  / Bili: x / Urobili: x   Blood: x / Protein: x / Nitrite: x   Leuk Esterase: x / RBC: x / WBC x   Sq Epi: x / Non Sq Epi: x / Bacteria: x      Alcohol, Blood: 228 mg/dL (09-21-24 @ 19:00)      RADIOLOGY & ADDITIONAL STUDIES:  < from: CT Head No Cont (09.21.24 @ 19:46) >    CT head:  Acute, nondepressed calvarial fracture of high right parietal bone.  Acute subarachnoid hemorrhage along right inferior frontal sulci.  CT cervical spine:  No evidence of acute fracture of the cervical spine.    < end of copied text >    < from: CT Chest w/ IV Cont (09.21.24 @ 22:31) >    1.  No evidence of acute traumatic injury to the chest, abdomen or pelvis.  2.  Acute noncomplicated sigmoid diverticulitis.  3.  Mildly enlarged liver with moderate fatty infiltration (hepatomegaly   with steatosis).  4.  Small focus of layering air in the urinary bladder, correlate for any   recent catheterization or [place].    < end of copied text >

## 2024-09-22 NOTE — PATIENT PROFILE ADULT - HOW PATIENT ADDRESSED, PROFILE
jaylon Quality 265: Biopsy Follow-Up: Biopsy results reviewed, communicated, tracked, and documented Quality 130: Documentation Of Current Medications In The Medical Record: Current Medications Documented Quality 128: Preventive Care And Screening: Body Mass Index (Bmi) Screening And Follow-Up Plan: BMI is documented within normal parameters and no follow-up plan is required. Detail Level: Detailed

## 2024-09-22 NOTE — PROGRESS NOTE ADULT - ASSESSMENT
64yM w/ PMHx of DM and HLD seen as a trauma consult s/p fall down 5 stairs in his home after drinking wine +HT, -LOC, -AC.  According to patient's wife, patient had about 2-3 glasses of wine and went down to the basement to watch TV when she heard a loud bang. She found the patient at the bottom of the stairs lying on his back. He was unable to get up on his own so EMS was called. Pt does not recall what happened. He does not complain of any pain at this time. Pt was evaluated by neurosurgery with recommendations for repeat head CT, Q1 neuro checks, keppra, SBP goal <140.    SICU consulted for Q1 neuro checks in setting of acute intracranial bleed.     NEUROLOGICAL:  # acute subarachnoid hemorrhage  - Q1 neurovascular checks  - repeat CTH #2: increased SAH along the right anterior frontal convexity, new SAH along the left anterior convexity, new intraparenchymal hemorrhage, new thin SDH along left and probable right parietal convexities, similar SAH in right parietal calvarial fracture.   - repeat CTH #3 in the AM 9/22  - keppra 750mg bid   - SBP <140  - DDVAP 32mcg x1 given  - TXA 1g x1 given      #Acute pain    -APAP 650mg ATC    - Gabapentin 100mg q8    - robaxin 500mg q8h for moderate pain     - dilaudid 0.25mg q6h prn for moderate pain    - dilaudid 0.5mg q6h prn for severe pain      RESPIRATORY   #Oxygenation    -currently saturating well on room air, supplemental O2 prn    - encourage IS  #Activity    -increase as tolerated      CARDS:   #PMH HTN    - f/u home medication    - SBP goal <140   #PMH HLD   - f/u home medication  GASTROINTESTINAL/NUTRITION:   #Diet, NPOx meds     -aspiration precautions, HOB 30  #GI Prophylaxis     -not indicated  #Bowel regimen    -none     -last bowel movement prior to this admission     /RENAL:   #urine output in critically ill    -voiding freely    Labs          Electrolytes-(09-21 @ 19:00)Na 134 // K 4.3 // Mg -- // Phos --          HEME/ONC:   #DVT prophylaxis     -Chemical: holding VTE in setting of acute intracranial bleed     -Mechanical: SCDs    -if DVT prophylaxis to be held, document and place VTE order        Labs: Hb/Hct:  13.7/40.3  (09-21 @ 19:00)  -->                      Plts:  220  -->                 PTT/INR:  41.9/1.13  --->,  33.1/1.18  --->          Blood Consent-obtain if acute anemia, q6 CBC    ID:  #monitor leukocytosis    - continue to monitor with daily labs   - monitor for fevers  #DTI   - no DTI seen on exam 9/21    WBC- 11.74  --->>  Temp trend- 24hrs T(F): 98.6 (09-21 @ 18:46), Max: 98.6 (09-21 @ 18:46)  Current antibiotics- zosyn 3.375 q8h     ENDOCRINE:  #PMH diabetes    - ISS      -FSG q6 if NPO     -Glucose goal 140-180.     - holding home metformin, glyburide, gemfibrozil    MSK:   - activity: ambulate as tolerated         LINES/DRAINS:  PIV    ADVANCED DIRECTIVES:  Full Code    HCP/Emergency Contact-    INDICATION FOR SICU: Q1 neurovascular checks in setting of acute intracranial bleed      DISPO:   Case discussed with attending Dr. Skaggs   64yM w/ PMHx of DM and HLD seen as a trauma consult s/p fall down 5 stairs in his home after drinking wine +HT, -LOC, -AC.  According to patient's wife, patient had about 2-3 glasses of wine and went down to the basement to watch TV when she heard a loud bang. She found the patient at the bottom of the stairs lying on his back. He was unable to get up on his own so EMS was called. Pt does not recall what happened. He does not complain of any pain at this time. Pt was evaluated by neurosurgery with recommendations for repeat head CT, Q1 neuro checks, keppra, SBP goal <140.    SICU consulted for Q1 neuro checks in setting of acute intracranial bleed.     NEUROLOGICAL:  # acute subarachnoid hemorrhage  - Q1 neurovascular checks  - repeat CTH #2: increased SAH along the right anterior frontal convexity, new SAH along the left anterior convexity, new intraparenchymal hemorrhage, new thin SDH along left and probable right parietal convexities, similar SAH in right parietal calvarial fracture.   - repeat CTH #3 in the AM 9/22  - keppra 750mg bid   - SBP <140  - DDVAP 32mcg x1 given  - TXA 1g x1 given      #Acute pain    -APAP 650mg ATC    - Gabapentin 100mg q8    - robaxin 500mg q8h for moderate pain     - dilaudid 0.25mg q6h prn for moderate pain    - dilaudid 0.5mg q6h prn for severe pain     #acute ETOH intoxication     - ativan 2mg IV prn for CIWA >8    -  CIWA      - Thiamine, folic acid      RESPIRATORY   #Oxygenation    -currently saturating well on room air, supplemental O2 prn    - encourage IS  #Activity    -increase as tolerated      CARDS:   #PMH HTN    - f/u home medication    - SBP goal <140   #PMH HLD   - f/u home medication  GASTROINTESTINAL/NUTRITION:   #Diet, NPOx meds     -aspiration precautions, HOB 30  #GI Prophylaxis     -not indicated  #Bowel regimen    -none     -last bowel movement prior to this admission     /RENAL:   #urine output in critically ill    -voiding freely    Labs          Electrolytes-(09-21 @ 19:00)Na 134 // K 4.3 // Mg -- // Phos --   #maintain euvolemia    - maintenance fluid NS@100cc/hr with 3% hypertonic saline @50cc/h  #hypomagnesemia   -repleted with 4g Mg       HEME/ONC:   #DVT prophylaxis     -Chemical: holding VTE in setting of acute intracranial bleed     -Mechanical: SCDs    -if DVT prophylaxis to be held, document and place VTE order        Labs: Hb/Hct:  13.7/40.3  (09-21 @ 19:00)  -->                      Plts:  220  -->                 PTT/INR:  41.9/1.13  --->,  33.1/1.18  --->          Blood Consent-obtain if acute anemia, q6 CBC    ID:  #monitor leukocytosis    - continue to monitor with daily labs   - monitor for fevers  #DTI   - no DTI seen on exam 9/21    WBC- 11.74  --->>  Temp trend- 24hrs T(F): 98.6 (09-21 @ 18:46), Max: 98.6 (09-21 @ 18:46)  Current antibiotics- zosyn 3.375 q8h     ENDOCRINE:  #PMH diabetes    - ISS      -FSG q6 if NPO     -Glucose goal 140-180.     - holding home metformin, glyburide, gemfibrozil    MSK:   - activity: ambulate as tolerated         LINES/DRAINS:  PIV    ADVANCED DIRECTIVES:  Full Code    HCP/Emergency Contact-    INDICATION FOR SICU: Q1 neurovascular checks in setting of acute intracranial bleed      DISPO:   Case discussed with attending Dr. Skaggs   64yM w/ PMHx of DM and HLD seen as a trauma consult s/p fall down 5 stairs in his home after drinking wine +HT, -LOC, -AC.  According to patient's wife, patient had about 2-3 glasses of wine and went down to the basement to watch TV when she heard a loud bang. She found the patient at the bottom of the stairs lying on his back. He was unable to get up on his own so EMS was called. Pt does not recall what happened. He does not complain of any pain at this time. Pt was evaluated by neurosurgery with recommendations for repeat head CT, Q1 neuro checks, keppra, SBP goal <140.    SICU consulted for Q1 neuro checks in setting of acute intracranial bleed.     NEUROLOGICAL:  # acute subarachnoid hemorrhage  - Q1 neurovascular checks  - repeat CTH #2: increased SAH along the right anterior frontal convexity, new SAH along the left anterior convexity, new intraparenchymal hemorrhage, new thin SDH along left and probable right parietal convexities, similar SAH in right parietal calvarial fracture.   - repeat CTH #3 in the AM 9/22  - keppra 750mg bid   - SBP <140  - DDVAP 32mcg x1 given  - TXA 1g x1 given    #Acute pain    -APAP 650mg ATC    - Gabapentin 100mg q8    - robaxin 500mg q8h for moderate pain     - dilaudid 0.25mg q6h prn for moderate pain    - dilaudid 0.5mg q6h prn for severe pain     #acute ETOH intoxication     - ativan 2mg IV prn for CIWA >8    -  CIWA      - Thiamine, folic acid      RESPIRATORY   #Oxygenation    -currently saturating well on 1L NC, wean as tolerated   - encourage IS, pulling 2.5L  #Activity    -increase as tolerated    CARDS:   #PMH HTN    - no home medications   - SBP goal <140   #PMH HLD   - holding home gemfibrozil 600mg TID    GASTROINTESTINAL/NUTRITION:   #Diet, NPO except medications    -aspiration precautions, HOB 30  #Acute uncomplicated diverticulitis     -asymptomatic    -zosyn q8h  #GI Prophylaxis     -not indicated  #Bowel regimen    -none     -last bowel movement 9/21    /RENAL:   #urine output in critically ill    -voiding freely   Labs:          BUN/Cr -  12/0.9  -->,  9/0.7  -->          Electrolytes-(09-22 @ 04:15)Na 133 // K 3.9 // Mg 1.4 // Phos 3.8   #maintain euvolemia    - maintenance fluid NS@100cc/hr with 3% hypertonic saline @50cc/h   - sodium goal 140-150  #hypomagnesemia   -repleted with 4g Mg       HEME/ONC:   #DVT prophylaxis     -Chemical: holding VTE in setting of acute intracranial bleed     -Mechanical: SCDs    -if DVT prophylaxis to be held, document and place VTE order     Labs: Hgb/Hct:  13.7/40.3  (09-21 @ 19:00)  -->,  12.6/37.7  (09-22 @ 04:15)  -->                      Platelets:  220  -->,  209  -->                 PTT/INR:  33.1/1.18  --->,  32.9/1.22  --->        Blood Consent-obtain if acute anemia, q6 CBC    ID:  #monitor leukocytosis   - continue to monitor with daily labs   - monitor for fevers  #DTI   - no DTI seen on exam 9/21  #diverticulitis     -zosyn q8h  WBC -  11.74  --->>,  9.36  --->>  Temp trend - 24hrs T(F): 98.9 (09-22 @ 08:00), Max: 100.4 (09-22 @ 03:30)    Current antibiotics- zosyn 3.375 q8h     ENDOCRINE:  #PMH diabetes    - ISS     -FSG q6 if NPO     -Glucose goal 140-180.     -holding home metformin, glyburide, gemfibrozil    MSK:   - activity: ambulate as tolerated       LINES/DRAINS:  PIV    ADVANCED DIRECTIVES:  Full Code    HCP/Emergency Contact-    INDICATION FOR SICU: Q1 neurovascular checks in setting of acute intracranial bleed      DISPO:   Case to be discussed with attending Dr. Skaggs

## 2024-09-22 NOTE — PROGRESS NOTE ADULT - SUBJECTIVE AND OBJECTIVE BOX
FRANCISCA COREY   [id number]   [birth year]  64yM    ============================   SICU Consult for Q1 neurovascular checks in setting of acute intracranial bleed       24 Hour Events  -Admission under SICU service    HPI     64yM w/ PMHx of DM and HLD seen as a trauma consult s/p fall down 5 stairs in his home after drinking wine +HT, -LOC, -AC.  According to patient's wife, patient had about 2-3 glasses of wine and went down to the basement to watch TV when she heard a loud bang. She found the patient at the bottom of the stairs lying on his back. He was unable to get up on his own so EMS was called. Pt was examined at bedside, with no acute complaints at this time. Pt does not recall what happened but is awake, alert, and oriented (person, place, and time)  Denies headache, dizziness, nausea, vomiting, blurry vision.     SICU consulted for Q1 neuro checks in setting of acute intracranial bleed.     Pertinent Imaging  < from: CT Head No Cont (09.21.24 @ 19:46) >  Impression:  CT head:  Acute, nondepressed calvarial fracture of high right parietal bone.  Acute subarachnoid hemorrhage along right inferior frontal sulci.  CT cervical spine:  No evidence of acute fracture of the cervical spine.    < end of copied text >      [X] A ten-point review of systems was otherwise negative except as noted above.  [  ] Due to altered mental status/intubation, subjective information was not attained from the patient. History was obtained, to the extent possible, from review of the chart and collateral sources of information.  ====================================================================       FRANCISCA COREY   [id number]   [birth year]  64yM    ============================   SICU Consult for Q1 neurovascular checks in setting of acute intracranial bleed       24 Hour Events  -Admission under SICU service    HPI     64yM w/ PMHx of DM and HLD seen as a trauma consult s/p fall down 5 stairs in his home after drinking wine +HT, -LOC, -AC.  According to patient's wife, patient had about 2-3 glasses of wine and went down to the basement to watch TV when she heard a loud bang. She found the patient at the bottom of the stairs lying on his back. He was unable to get up on his own so EMS was called. Pt was examined at bedside, with no acute complaints at this time. Pt does not recall what happened but is awake, alert, and oriented (person, place, and time)  Denies headache, dizziness, nausea, vomiting, blurry vision.     SICU consulted for Q1 neuro checks in setting of acute intracranial bleed.     Pertinent Imaging  < from: CT Head No Cont (09.21.24 @ 19:46) >  Impression:  CT head:  Acute, nondepressed calvarial fracture of high right parietal bone.  Acute subarachnoid hemorrhage along right inferior frontal sulci.  CT cervical spine:  No evidence of acute fracture of the cervical spine.    < end of copied text >      [X] A ten-point review of systems was otherwise negative except as noted above.  [  ] Due to altered mental status/intubation, subjective information was not attained from the patient. History was obtained, to the extent possible, from review of the chart and collateral sources of information.  ====================================================================  Daily Height in cm: 175.26 (22 Sep 2024 06:35)    Daily     Diet, NPO:   Except Medications (09-22-24 @ 01:52)      CURRENT MEDS:  Neurologic Medications  acetaminophen     Tablet .. 650 milliGRAM(s) Oral every 6 hours PRN Mild Pain (1 - 3)  gabapentin 100 milliGRAM(s) Oral three times a day  HYDROmorphone  Injectable 0.25 milliGRAM(s) IV Push every 4 hours PRN Moderate Pain (4 - 6)  HYDROmorphone  Injectable 0.5 milliGRAM(s) IV Push every 4 hours PRN Severe Pain (7 - 10)  levETIRAcetam 750 milliGRAM(s) Oral two times a day  methocarbamol 500 milliGRAM(s) Oral every 8 hours PRN moderate pain  ondansetron Injectable 4 milliGRAM(s) IV Push every 6 hours PRN Nausea    Respiratory Medications    Cardiovascular Medications    Gastrointestinal Medications  dextrose 5%. 1000 milliLiter(s) IV Continuous <Continuous>  dextrose 5%. 1000 milliLiter(s) IV Continuous <Continuous>  folic acid 1 milliGRAM(s) Oral daily  sodium chloride 0.9%. 1000 milliLiter(s) IV Continuous <Continuous>  sodium chloride 3%. 500 milliLiter(s) IV Continuous <Continuous>  thiamine 100 milliGRAM(s) Oral daily    Genitourinary Medications    Hematologic/Oncologic Medications  influenza   Vaccine 0.5 milliLiter(s) IntraMuscular once    Antimicrobial/Immunologic Medications  piperacillin/tazobactam IVPB.. 3.375 Gram(s) IV Intermittent every 8 hours    Endocrine/Metabolic Medications  dextrose 50% Injectable 25 Gram(s) IV Push once  dextrose 50% Injectable 25 Gram(s) IV Push once  dextrose 50% Injectable 12.5 Gram(s) IV Push once  dextrose Oral Gel 15 Gram(s) Oral once PRN Blood Glucose LESS THAN 70 milliGRAM(s)/deciliter  glucagon  Injectable 1 milliGRAM(s) IntraMuscular once  insulin lispro (ADMELOG) corrective regimen sliding scale   SubCutaneous every 6 hours    Topical/Other Medications  chlorhexidine 2% Cloths 1 Application(s) Topical <User Schedule>      ICU Vital Signs Last 24 Hrs  T(C): 37.2 (22 Sep 2024 08:00), Max: 38 (22 Sep 2024 03:30)  T(F): 98.9 (22 Sep 2024 08:00), Max: 100.4 (22 Sep 2024 03:30)  HR: 87 (22 Sep 2024 07:00) (87 - 97)  BP: 113/55 (22 Sep 2024 07:00) (113/55 - 169/79)  BP(mean): 79 (22 Sep 2024 07:00) (79 - 103)  RR: 19 (22 Sep 2024 07:00) (16 - 20)  SpO2: 95% (22 Sep 2024 07:00) (94% - 100%)    O2 Parameters below as of 22 Sep 2024 07:00  Patient On (Oxygen Delivery Method): nasal cannula  O2 Flow (L/min): 2      I&O's Summary    21 Sep 2024 07:01  -  22 Sep 2024 07:00  --------------------------------------------------------  IN: 850 mL / OUT: 0 mL / NET: 850 mL      I&O's Detail    21 Sep 2024 07:01  -  22 Sep 2024 07:00  --------------------------------------------------------  IN:    IV PiggyBack: 100 mL    IV PiggyBack: 50 mL    Oral Fluid: 100 mL    sodium chloride 0.9%: 400 mL    sodium chloride 3%: 200 mL  Total IN: 850 mL    OUT:    Voided (mL): 0 mL  Total OUT: 0 mL    Total NET: 850 mL      PHYSICAL EXAM:    General/Neuro:  GCS: 15,  alert & oriented x 4, following commands, strength 5/5 and equal b/l, no focal deficits.    Lungs:  Clear to auscultation, Normal expansion/effort on 1L NC. Pulling 2.5L on IS.     Cardiovascular : S1, S2.  Regular rate and rhythm.  No peripheral edema     GI: Abdomen soft, Non-tender, Non-distended.      Extremities: Extremities warm, pink, well-perfused.    Derm: Good skin turgor, no skin breakdown.      : voiding freely      CXR:     LABS:  CAPILLARY BLOOD GLUCOSE      POCT Blood Glucose.: 164 mg/dL (22 Sep 2024 05:44)  POCT Blood Glucose.: 150 mg/dL (22 Sep 2024 03:15)  POCT Blood Glucose.: 174 mg/dL (21 Sep 2024 18:55)                          12.6   9.36  )-----------( 209      ( 22 Sep 2024 04:15 )             37.7       09-22    133[L]  |  100  |  9[L]  ----------------------------<  159[H]  3.9   |  20  |  0.7    Ca    8.8      22 Sep 2024 04:15  Phos  3.8     09-22  Mg     1.4     09-22    TPro  8.0  /  Alb  4.7  /  TBili  0.5  /  DBili  x   /  AST  44[H]  /  ALT  29  /  AlkPhos  76  09-21      PT/INR - ( 22 Sep 2024 04:15 )   PT: 13.90 sec;   INR: 1.22 ratio         PTT - ( 22 Sep 2024 04:15 )  PTT:32.9 sec      Urinalysis Basic - ( 22 Sep 2024 04:15 )    Color: x / Appearance: x / SG: x / pH: x  Gluc: 159 mg/dL / Ketone: x  / Bili: x / Urobili: x   Blood: x / Protein: x / Nitrite: x   Leuk Esterase: x / RBC: x / WBC x   Sq Epi: x / Non Sq Epi: x / Bacteria: x

## 2024-09-22 NOTE — CONSULT NOTE ADULT - ASSESSMENT
65 y/o male s/p fall down 5 stairs brought to [facility] and found to have a SAH and calverium fracture. BAL was 220 on admission.     Impression    SAH s/p HT  Calvarium Fracture  ETOH intoxication     Plan    Repeat CT head wo con noted, worsening SAH in the setting of trauma  BP monitoring 110-140  Neurochecks Q1hr  Give TXA 1g now  DDAVP .3mcg/kg IVPB  Repeat CTH 4-6 hour from recent scan  Would give NS in the setting of possible diverticulitis and metabolic acidosis  NA goal 140-150 within the next 48 hours  Fall precautions  Seizure ppx keppra 750mg BID  Hold chemical dvt ppx, SCDs, duplex LE   patient being admitted to SICU   65 y/o male s/p fall down 5 stairs brought to [facility] and found to have a SAH and calverium fracture. BAL was 220 on admission.     Impression    SAH s/p HT  Calvarium Fracture  ETOH intoxication     Plan    Repeat CT head wo con noted, worsening SAH in the setting of trauma  BP monitoring 110-140  Neurochecks Q1hr  Give TXA 1g now  DDAVP 0.3mcg/kg IVPB over 20  min x1  Repeat CTH 4-6 hour from recent scan  Would give NS in the setting of possible diverticulitis and metabolic acidosis  NA goal 140-150 within the next 48 hours  Fall precautions  Seizure ppx keppra 750mg IV q 12  Hold chemical dvt ppx   SCDs, duplex LE   patient being admitted to SICU

## 2024-09-22 NOTE — PATIENT PROFILE ADULT - FALL HARM RISK - HARM RISK INTERVENTIONS
SUBJECTIVE  HPI:Nanci Rooney is a 55 year old female who presents today for discussion about continuing use of Lysteda for control of heavy bleeding. Annual exam was done with PCP. Lysteda works most of the time to decrease bleeding.      ROS:   No headaches  No unexplained chest pain, dyspnea, SOB  No abdominal pain  No bowel or bladder complaints    GYNE ROS:   Vaginal symptoms: none.  Vulvar symptoms: none.  All other systems reviewed are negative    HISTORIES:  Allergies: ALLERGIES:  Patient has no known allergies.    Social History     Tobacco Use   • Smoking status: Every Day     Current packs/day: 0.25     Average packs/day: 0.3 packs/day for 30.0 years (7.5 ttl pk-yrs)     Types: Cigarettes   • Smokeless tobacco: Never   Vaping Use   • Vaping status: never used   Substance Use Topics   • Alcohol use: Yes     Comment: rare   • Drug use: Never       Current Outpatient Medications   Medication Sig Dispense Refill   • tranexamic acid (LYSTEDA) 650 MG tablet Take two tablets by mouth three times a day, to be taken on days of heaviest bleeding. 30 tablet 11   • albuterol 108 (90 Base) MCG/ACT inhaler Inhale 2 puffs into the lungs every 4 hours as needed for Shortness of Breath or Wheezing. 1 each 1   • cyclobenzaprine (FLEXERIL) 10 MG tablet Take 1 tablet by mouth 3 times daily as needed for Muscle spasms. 60 tablet 0   • ibuprofen (MOTRIN) 200 MG tablet Take 200 mg by mouth every 6 hours as needed for Pain.       No current facility-administered medications for this visit.         OBJECTIVE  Vitals: Visit Vitals  /84   Pulse 85   Temp 96.8 °F (36 °C) (Temporal)   Resp 14   Ht 5' 1\" (1.549 m)   Wt 71.9 kg (158 lb 8.2 oz)   LMP 07/26/2024   SpO2 96%   BMI 29.95 kg/m²     Nanci's BMI is Body mass index is 29.95 kg/m².    OBGyn Exam    Alert and oriented x 3.   General exam: Patient appears well.  Skin: no rashes or suspicious skin lesions noted.    Pelvic Exam: deferred    I have personally reviewed and  analyzed pt's previous: previous visit.     ASSESSMENT/PLAN  1. Menorrhagia with regular cycle  Discussed options to evaluate, pt declines us at this time. Periods are regular, heavy just for the first 2-3 days. Will continue Lysteda, rx sent to pharmacy of pt's choice.     Pt verbalized understanding and agreed to comply with all instructions.      Total time spent: 20 min    Bette Dhillon, CNP         Assistance with ambulation/Assistance OOB with selected safe patient handling equipment/Communicate Risk of Fall with Harm to all staff/Monitor for mental status changes/Monitor gait and stability/Reinforce activity limits and safety measures with patient and family/Tailored Fall Risk Interventions/Toileting schedule using arm’s reach rule for commode and bathroom/Use of alarms - bed, chair and/or voice tab/Visual Cue: Yellow wristband and red socks/Bed in lowest position, wheels locked, appropriate side rails in place/Call bell, personal items and telephone in reach/Instruct patient to call for assistance before getting out of bed or chair/Non-slip footwear when patient is out of bed/[place] to call system/Physically safe environment - no spills, clutter or unnecessary equipment/Purposeful Proactive Rounding/Room/bathroom lighting operational, light cord in reach

## 2024-09-22 NOTE — PROGRESS NOTE ADULT - SUBJECTIVE AND OBJECTIVE BOX
S/p  Fall          Bifrontal contusions       pt seen and examined at bedside pt is alert no c/o HA at this follows commands       Vital Signs Last 24 Hrs  T(C): 37.9 (22 Sep 2024 16:00), Max: 38 (22 Sep 2024 03:30)  T(F): 100.3 (22 Sep 2024 16:00), Max: 100.4 (22 Sep 2024 03:30)  HR: 77 (22 Sep 2024 16:15) (68 - 97)  BP: 133/66 (22 Sep 2024 16:15) (111/56 - 169/80)  BP(mean): 94 (22 Sep 2024 16:15) (79 - 115)  RR: 16 (22 Sep 2024 16:15) (15 - 20)  SpO2: 99% (22 Sep 2024 16:15) (93% - 100%)    Parameters below as of 22 Sep 2024 16:15  Patient On (Oxygen Delivery Method): room air        I&O's Detail    21 Sep 2024 07:01  -  22 Sep 2024 07:00  --------------------------------------------------------  IN:    IV PiggyBack: 100 mL    IV PiggyBack: 50 mL    Oral Fluid: 100 mL    sodium chloride 0.9%: 400 mL    sodium chloride 3%: 200 mL  Total IN: 850 mL    OUT:    Voided (mL): 0 mL  Total OUT: 0 mL    Total NET: 850 mL      22 Sep 2024 07:01  -  22 Sep 2024 17:21  --------------------------------------------------------  IN:    IV PiggyBack: 50 mL    IV PiggyBack: 50 mL    Oral Fluid: 360 mL    sodium chloride 0.9%: 300 mL    sodium chloride 3%: 500 mL  Total IN: 1260 mL    OUT:    Voided (mL): 900 mL  Total OUT: 900 mL    Total NET: 360 mL        I&O's Summary    21 Sep 2024 07:01  -  22 Sep 2024 07:00  --------------------------------------------------------  IN: 850 mL / OUT: 0 mL / NET: 850 mL    22 Sep 2024 07:01  -  22 Sep 2024 17:21  --------------------------------------------------------  IN: 1260 mL / OUT: 900 mL / NET: 360 mL        PHYSICAL EXAM:    Alert, Follows commands   A&OX3, PERRL   EOM ( I )   No facial asymmetry   MAEX4   MS bilateral UE's 5/5         bilateral LE's 5/5   no drift       LABS:                        12.6   9.36  )-----------( 209      ( 22 Sep 2024 04:15 )             37.7     09-22    138  |  105  |  9[L]  ----------------------------<  174[H]  4.3   |  19  |  0.8    Ca    8.6      22 Sep 2024 11:37  Phos  3.8     09-22  Mg     1.4     09-22    TPro  8.0  /  Alb  4.7  /  TBili  0.5  /  DBili  x   /  AST  44[H]  /  ALT  29  /  AlkPhos  76  09-21    PT/INR - ( 22 Sep 2024 04:15 )   PT: 13.90 sec;   INR: 1.22 ratio         PTT - ( 22 Sep 2024 04:15 )  PTT:32.9 sec  Urinalysis Basic - ( 22 Sep 2024 11:37 )    Color: x / Appearance: x / SG: x / pH: x  Gluc: 174 mg/dL / Ketone: x  / Bili: x / Urobili: x   Blood: x / Protein: x / Nitrite: x   Leuk Esterase: x / RBC: x / WBC x   Sq Epi: x / Non Sq Epi: x / Bacteria: x          CAPILLARY BLOOD GLUCOSE      POCT Blood Glucose.: 178 mg/dL (22 Sep 2024 11:41)  POCT Blood Glucose.: 164 mg/dL (22 Sep 2024 05:44)  POCT Blood Glucose.: 150 mg/dL (22 Sep 2024 03:15)  POCT Blood Glucose.: 174 mg/dL (21 Sep 2024 18:55)      Drug Levels: [] N/A    CSF Analysis: [] N/A      Allergies    No Known Allergies    Intolerances      MEDICATIONS:  Antibiotics:  cefTRIAXone   IVPB 1000 milliGRAM(s) IV Intermittent every 24 hours    Neuro:  acetaminophen     Tablet .. 650 milliGRAM(s) Oral every 6 hours PRN  levETIRAcetam 750 milliGRAM(s) Oral two times a day  methocarbamol 500 milliGRAM(s) Oral every 8 hours  ondansetron Injectable 4 milliGRAM(s) IV Push every 6 hours PRN    Anticoagulation:    OTHER:  chlorhexidine 2% Cloths 1 Application(s) Topical <User Schedule>  influenza   Vaccine 0.5 milliLiter(s) IntraMuscular once  insulin lispro (ADMELOG) corrective regimen sliding scale   SubCutaneous Before meals and at bedtime    IVF:  folic acid 1 milliGRAM(s) Oral daily  sodium chloride 3%. 500 milliLiter(s) IV Continuous <Continuous>  thiamine 100 milliGRAM(s) Oral daily    CULTURES:    RADIOLOGY & ADDITIONAL TESTS:      ASSESSMENT:  64y Male s/p    Unspecified focal traumatic brain injury with loss of consciousness status unknown, initial encounter    Handoff    MEWS Score    Diabetes    Traumatic intracranial hemorrhage    SAH (subarachnoid hemorrhage)    Other fracture of skull    FALL    90+    Calvarial fracture    Diverticulitis    SysAdmin_VstLnk        A/p              S/P Fall                    Bifrontal Contusions                    May start SQ heparin                    Continue Keppra 750 mg q 12                    Keep Neuro checks q1 hour for now                    SBP < 140        S/p  Fall          Bifrontal contusions       pt seen and examined at bedside pt is alert no c/o HA at this follows commands . Called patients wife carmina  and updated her on his HCT's , exam and plan       Vital Signs Last 24 Hrs  T(C): 37.9 (22 Sep 2024 16:00), Max: 38 (22 Sep 2024 03:30)  T(F): 100.3 (22 Sep 2024 16:00), Max: 100.4 (22 Sep 2024 03:30)  HR: 77 (22 Sep 2024 16:15) (68 - 97)  BP: 133/66 (22 Sep 2024 16:15) (111/56 - 169/80)  BP(mean): 94 (22 Sep 2024 16:15) (79 - 115)  RR: 16 (22 Sep 2024 16:15) (15 - 20)  SpO2: 99% (22 Sep 2024 16:15) (93% - 100%)    Parameters below as of 22 Sep 2024 16:15  Patient On (Oxygen Delivery Method): room air        I&O's Detail    21 Sep 2024 07:01  -  22 Sep 2024 07:00  --------------------------------------------------------  IN:    IV PiggyBack: 100 mL    IV PiggyBack: 50 mL    Oral Fluid: 100 mL    sodium chloride 0.9%: 400 mL    sodium chloride 3%: 200 mL  Total IN: 850 mL    OUT:    Voided (mL): 0 mL  Total OUT: 0 mL    Total NET: 850 mL      22 Sep 2024 07:01  -  22 Sep 2024 17:21  --------------------------------------------------------  IN:    IV PiggyBack: 50 mL    IV PiggyBack: 50 mL    Oral Fluid: 360 mL    sodium chloride 0.9%: 300 mL    sodium chloride 3%: 500 mL  Total IN: 1260 mL    OUT:    Voided (mL): 900 mL  Total OUT: 900 mL    Total NET: 360 mL        I&O's Summary    21 Sep 2024 07:01  -  22 Sep 2024 07:00  --------------------------------------------------------  IN: 850 mL / OUT: 0 mL / NET: 850 mL    22 Sep 2024 07:01  -  22 Sep 2024 17:21  --------------------------------------------------------  IN: 1260 mL / OUT: 900 mL / NET: 360 mL        PHYSICAL EXAM:    Alert, Follows commands   A&OX3, PERRL   EOM ( I )   No facial asymmetry   MAEX4   MS bilateral UE's 5/5         bilateral LE's 5/5   no drift       LABS:                        12.6   9.36  )-----------( 209      ( 22 Sep 2024 04:15 )             37.7     09-22    138  |  105  |  9[L]  ----------------------------<  174[H]  4.3   |  19  |  0.8    Ca    8.6      22 Sep 2024 11:37  Phos  3.8     09-22  Mg     1.4     09-22    TPro  8.0  /  Alb  4.7  /  TBili  0.5  /  DBili  x   /  AST  44[H]  /  ALT  29  /  AlkPhos  76  09-21    PT/INR - ( 22 Sep 2024 04:15 )   PT: 13.90 sec;   INR: 1.22 ratio         PTT - ( 22 Sep 2024 04:15 )  PTT:32.9 sec  Urinalysis Basic - ( 22 Sep 2024 11:37 )    Color: x / Appearance: x / SG: x / pH: x  Gluc: 174 mg/dL / Ketone: x  / Bili: x / Urobili: x   Blood: x / Protein: x / Nitrite: x   Leuk Esterase: x / RBC: x / WBC x   Sq Epi: x / Non Sq Epi: x / Bacteria: x          CAPILLARY BLOOD GLUCOSE      POCT Blood Glucose.: 178 mg/dL (22 Sep 2024 11:41)  POCT Blood Glucose.: 164 mg/dL (22 Sep 2024 05:44)  POCT Blood Glucose.: 150 mg/dL (22 Sep 2024 03:15)  POCT Blood Glucose.: 174 mg/dL (21 Sep 2024 18:55)      Drug Levels: [] N/A    CSF Analysis: [] N/A      Allergies    No Known Allergies    Intolerances      MEDICATIONS:  Antibiotics:  cefTRIAXone   IVPB 1000 milliGRAM(s) IV Intermittent every 24 hours    Neuro:  acetaminophen     Tablet .. 650 milliGRAM(s) Oral every 6 hours PRN  levETIRAcetam 750 milliGRAM(s) Oral two times a day  methocarbamol 500 milliGRAM(s) Oral every 8 hours  ondansetron Injectable 4 milliGRAM(s) IV Push every 6 hours PRN    Anticoagulation:    OTHER:  chlorhexidine 2% Cloths 1 Application(s) Topical <User Schedule>  influenza   Vaccine 0.5 milliLiter(s) IntraMuscular once  insulin lispro (ADMELOG) corrective regimen sliding scale   SubCutaneous Before meals and at bedtime    IVF:  folic acid 1 milliGRAM(s) Oral daily  sodium chloride 3%. 500 milliLiter(s) IV Continuous <Continuous>  thiamine 100 milliGRAM(s) Oral daily    CULTURES:    RADIOLOGY & ADDITIONAL TESTS:      ASSESSMENT:  64y Male s/p    Unspecified focal traumatic brain injury with loss of consciousness status unknown, initial encounter    Handoff    MEWS Score    Diabetes    Traumatic intracranial hemorrhage    SAH (subarachnoid hemorrhage)    Other fracture of skull    FALL    90+    Calvarial fracture    Diverticulitis    SysAdmin_VstLnk        A/p              S/P Fall                    Bifrontal Contusions                    May start SQ heparin                    Continue Keppra 750 mg q 12                    Keep Neuro checks q1 hour for now                    SBP < 140

## 2024-09-22 NOTE — H&P ADULT - NSHPPHYSICALEXAM_GEN_ALL_CORE
Secondary Survey:   General: NAD  HEENT: Normocephalic, atraumatic, EOMI, PEERLA. no scalp lacerations   Neck: Soft, midline trachea. no c-spine tenderness  Chest: No chest wall tenderness, no subcutaneous emphysema   Cardiac: S1, S2, RRR  Respiratory: Bilateral breath sounds, clear and equal bilaterally  Abdomen: Soft, non-distended, non-tender, no rebound, no guarding.  Groin: Normal appearing, pelvis stable   Ext:  Moving b/l upper and lower extremities. Palpable Radial b/l UE, b/l DP palpable in LE.   Back: No T/L/S spine tenderness, No palpable runoff/stepoff/deformity

## 2024-09-22 NOTE — H&P ADULT - HISTORY OF PRESENT ILLNESS
TRAUMA ACTIVATION LEVEL:  CONSULT  ACTIVATED BY: ED  INTUBATED: NO      MECHANISM OF INJURY:   [] Blunt     [] MVC	  [X] Fall	  [] Pedestrian Struck	      GCS: 15 	E: 4	V: 5	M: 6    HPI:    64yM w/ PMHx of DM and HLD seen as a trauma consult s/p fall down 5 stairs in his home after drinking wine +HT, -LOC, -AC.  According to patient's wife, patient had about 2-3 glasses of wine and went down to the basement to watch TV when she heard a loud bang. She found the patient at the bottom of the stairs lying on his back. He was unable to get up on his own so EMS was called. He does not complain of any pain at this time. Trauma assessment in ED: ABCs intact , GCS 15 , AAOx3.   On admission, patient had CTH and Cspine which showed skull fracture ans SAH. Trauma activated.

## 2024-09-22 NOTE — H&P ADULT - ATTENDING COMMENTS
This note reflects my exam and care of this patient on 9/21/2024.    This is 63 y/o male  w/ PMHx of DM and HLD seen as a trauma consult s/p fall down 5 stairs in his home after drinking wine +HT, -LOC, -AC.  According to patient's wife, patient had about 2-3 glasses of wine and went down to the basement to watch TV when she heard a loud bang. She found the patient at the bottom of the stairs lying on his back. He was unable to get up on his own so EMS was called. He does not complain of any pain at this time. Trauma assessment in ED: ABCs intact , GCS 15 , AAOx3.   On admission, patient had CTH and C-spine which showed skull fracture ans SAH. Trauma Consult was activated.    Primary and secondary surveys were performed.    AAO x3  GCS 15.  Neuro intact.  Neck; no step-offs  Chest: clear.  CV : RRR  Abdomen: soft  Extr: no edema    I independently reviewed all images and labs:    CT Head #1 - showed Right Frontal SAH and Right parietal Bone Fracture.  CT Head #2 - increased ICHs and now Bilateral SAHs, Bilateral Intracerebral Hematomas; Bilateral SDHs (GCS remains 15).  CT C- spine - no injury  CT Chest/Abd/Pelvis - no injury    Lactic acid 2.9  Alcohol in serum 228    ASSESSMENT:  63 y/o male, S/p Fall down stairs.  Traumatic  Bilateral SAHs.  Traumatic  Bilateral Intracerebral Hematomas.  Traumatic Bilateral SDHs.  Right parietal Bone Fracture.  At risk for neuro deficit.  Acute lactic acidosis.  Acute alcohol intoxication.    PLAN:  - neuro checks q1h  - Neurosurgery eval - recommended CT head #3 at 6am   - on Keppra for seizure prophylaxis  - give TXA  - continuous Sao2 and hemodynamic monitoring  - head elevation at 30 degree  - put on 3% saline at 50 ml/h - target Na 145 and above  - keep normotensive  - NPO  - follow serum electrolytes and UOP  - ID - universal precautions  - DVT prophylaxis with SCDs only

## 2024-09-22 NOTE — H&P ADULT - ASSESSMENT
ASSESSMENT:  64yM w/ PMHx of DM and HLD seen as a trauma consult s/p fall down 5 stairs in his home after drinking wine +HT, -LOC, -AC. Trauma assessment in ED: ABCs intact , GCS 15 , AAOx3,  OVALLES.     Injuries identified:   - right frontal SAH,  - right parietal skull fx     Plan:   # Acute frontal SAH and parietal skull fx   - Neurosurgery consulted. Repeat CTH. keppra. hold AP and AC . systolic <140   - repeat CTH worsened. f/u neurosurg reccs   - SICU admission for q1h neurochecks   - head of bed elevation  - aspiration precautions   - hold dvt ppx   - will need CTH #3 in 4-6 hours   - NPO for now with IVF     # acute ETOH intoxication   - CIWA protocol    #incidental noncomplicated sigmoid diverticulitis   - No abdominal pain  - was previously on PO abx regimen as outpatient for diverticulitis   - consider starting IV abx while inpatient     #hx of diabetes  - ISS as needed   - q6h finger sticks while NPO     Above discussed with trauma attending, Dr. Skaggs and patient /family.

## 2024-09-23 LAB
A1C WITH ESTIMATED AVERAGE GLUCOSE RESULT: 5.9 % — HIGH (ref 4–5.6)
ANION GAP SERPL CALC-SCNC: 11 MMOL/L — SIGNIFICANT CHANGE UP (ref 7–14)
ANION GAP SERPL CALC-SCNC: 13 MMOL/L — SIGNIFICANT CHANGE UP (ref 7–14)
ANION GAP SERPL CALC-SCNC: 14 MMOL/L — SIGNIFICANT CHANGE UP (ref 7–14)
ANION GAP SERPL CALC-SCNC: 15 MMOL/L — HIGH (ref 7–14)
BASOPHILS # BLD AUTO: 0.06 K/UL — SIGNIFICANT CHANGE UP (ref 0–0.2)
BASOPHILS NFR BLD AUTO: 0.6 % — SIGNIFICANT CHANGE UP (ref 0–1)
BUN SERPL-MCNC: 10 MG/DL — SIGNIFICANT CHANGE UP (ref 10–20)
BUN SERPL-MCNC: 9 MG/DL — LOW (ref 10–20)
CALCIUM SERPL-MCNC: 8.3 MG/DL — LOW (ref 8.4–10.5)
CALCIUM SERPL-MCNC: 8.4 MG/DL — SIGNIFICANT CHANGE UP (ref 8.4–10.5)
CALCIUM SERPL-MCNC: 8.6 MG/DL — SIGNIFICANT CHANGE UP (ref 8.4–10.5)
CALCIUM SERPL-MCNC: 8.6 MG/DL — SIGNIFICANT CHANGE UP (ref 8.4–10.5)
CHLORIDE SERPL-SCNC: 100 MMOL/L — SIGNIFICANT CHANGE UP (ref 98–110)
CHLORIDE SERPL-SCNC: 104 MMOL/L — SIGNIFICANT CHANGE UP (ref 98–110)
CHLORIDE SERPL-SCNC: 105 MMOL/L — SIGNIFICANT CHANGE UP (ref 98–110)
CHLORIDE SERPL-SCNC: 106 MMOL/L — SIGNIFICANT CHANGE UP (ref 98–110)
CO2 SERPL-SCNC: 18 MMOL/L — SIGNIFICANT CHANGE UP (ref 17–32)
CO2 SERPL-SCNC: 18 MMOL/L — SIGNIFICANT CHANGE UP (ref 17–32)
CO2 SERPL-SCNC: 19 MMOL/L — SIGNIFICANT CHANGE UP (ref 17–32)
CO2 SERPL-SCNC: 20 MMOL/L — SIGNIFICANT CHANGE UP (ref 17–32)
CREAT SERPL-MCNC: 0.6 MG/DL — LOW (ref 0.7–1.5)
CREAT SERPL-MCNC: 0.6 MG/DL — LOW (ref 0.7–1.5)
CREAT SERPL-MCNC: 0.7 MG/DL — SIGNIFICANT CHANGE UP (ref 0.7–1.5)
CREAT SERPL-MCNC: 0.7 MG/DL — SIGNIFICANT CHANGE UP (ref 0.7–1.5)
EGFR: 104 ML/MIN/1.73M2 — SIGNIFICANT CHANGE UP
EGFR: 104 ML/MIN/1.73M2 — SIGNIFICANT CHANGE UP
EGFR: 109 ML/MIN/1.73M2 — SIGNIFICANT CHANGE UP
EGFR: 109 ML/MIN/1.73M2 — SIGNIFICANT CHANGE UP
EOSINOPHIL # BLD AUTO: 0.13 K/UL — SIGNIFICANT CHANGE UP (ref 0–0.7)
EOSINOPHIL NFR BLD AUTO: 1.3 % — SIGNIFICANT CHANGE UP (ref 0–8)
ESTIMATED AVERAGE GLUCOSE: 123 MG/DL — HIGH (ref 68–114)
GLUCOSE BLDC GLUCOMTR-MCNC: 135 MG/DL — HIGH (ref 70–99)
GLUCOSE BLDC GLUCOMTR-MCNC: 142 MG/DL — HIGH (ref 70–99)
GLUCOSE BLDC GLUCOMTR-MCNC: 154 MG/DL — HIGH (ref 70–99)
GLUCOSE BLDC GLUCOMTR-MCNC: 170 MG/DL — HIGH (ref 70–99)
GLUCOSE SERPL-MCNC: 129 MG/DL — HIGH (ref 70–99)
GLUCOSE SERPL-MCNC: 143 MG/DL — HIGH (ref 70–99)
GLUCOSE SERPL-MCNC: 153 MG/DL — HIGH (ref 70–99)
GLUCOSE SERPL-MCNC: 173 MG/DL — HIGH (ref 70–99)
HCT VFR BLD CALC: 35.1 % — LOW (ref 42–52)
HCT VFR BLD CALC: 36.1 % — LOW (ref 42–52)
HGB BLD-MCNC: 11.8 G/DL — LOW (ref 14–18)
HGB BLD-MCNC: 12 G/DL — LOW (ref 14–18)
IMM GRANULOCYTES NFR BLD AUTO: 0.4 % — HIGH (ref 0.1–0.3)
LYMPHOCYTES # BLD AUTO: 2.15 K/UL — SIGNIFICANT CHANGE UP (ref 1.2–3.4)
LYMPHOCYTES # BLD AUTO: 20.7 % — SIGNIFICANT CHANGE UP (ref 20.5–51.1)
MAGNESIUM SERPL-MCNC: 1.9 MG/DL — SIGNIFICANT CHANGE UP (ref 1.8–2.4)
MAGNESIUM SERPL-MCNC: 2.3 MG/DL — SIGNIFICANT CHANGE UP (ref 1.8–2.4)
MCHC RBC-ENTMCNC: 32.3 PG — HIGH (ref 27–31)
MCHC RBC-ENTMCNC: 32.3 PG — HIGH (ref 27–31)
MCHC RBC-ENTMCNC: 33.2 G/DL — SIGNIFICANT CHANGE UP (ref 32–37)
MCHC RBC-ENTMCNC: 33.6 G/DL — SIGNIFICANT CHANGE UP (ref 32–37)
MCV RBC AUTO: 96.2 FL — HIGH (ref 80–94)
MCV RBC AUTO: 97.3 FL — HIGH (ref 80–94)
MONOCYTES # BLD AUTO: 1.24 K/UL — HIGH (ref 0.1–0.6)
MONOCYTES NFR BLD AUTO: 11.9 % — HIGH (ref 1.7–9.3)
NEUTROPHILS # BLD AUTO: 6.78 K/UL — HIGH (ref 1.4–6.5)
NEUTROPHILS NFR BLD AUTO: 65.1 % — SIGNIFICANT CHANGE UP (ref 42.2–75.2)
NRBC # BLD: 0 /100 WBCS — SIGNIFICANT CHANGE UP (ref 0–0)
NRBC # BLD: 0 /100 WBCS — SIGNIFICANT CHANGE UP (ref 0–0)
OSMOLALITY SERPL: 287 MOS/KG — SIGNIFICANT CHANGE UP (ref 280–301)
PHOSPHATE SERPL-MCNC: 2.3 MG/DL — SIGNIFICANT CHANGE UP (ref 2.1–4.9)
PHOSPHATE SERPL-MCNC: 2.8 MG/DL — SIGNIFICANT CHANGE UP (ref 2.1–4.9)
PLATELET # BLD AUTO: 199 K/UL — SIGNIFICANT CHANGE UP (ref 130–400)
PLATELET # BLD AUTO: 213 K/UL — SIGNIFICANT CHANGE UP (ref 130–400)
PMV BLD: 10.3 FL — SIGNIFICANT CHANGE UP (ref 7.4–10.4)
PMV BLD: 9.8 FL — SIGNIFICANT CHANGE UP (ref 7.4–10.4)
POTASSIUM SERPL-MCNC: 3.8 MMOL/L — SIGNIFICANT CHANGE UP (ref 3.5–5)
POTASSIUM SERPL-MCNC: 4 MMOL/L — SIGNIFICANT CHANGE UP (ref 3.5–5)
POTASSIUM SERPL-MCNC: 4.2 MMOL/L — SIGNIFICANT CHANGE UP (ref 3.5–5)
POTASSIUM SERPL-MCNC: 4.2 MMOL/L — SIGNIFICANT CHANGE UP (ref 3.5–5)
POTASSIUM SERPL-SCNC: 3.8 MMOL/L — SIGNIFICANT CHANGE UP (ref 3.5–5)
POTASSIUM SERPL-SCNC: 4 MMOL/L — SIGNIFICANT CHANGE UP (ref 3.5–5)
POTASSIUM SERPL-SCNC: 4.2 MMOL/L — SIGNIFICANT CHANGE UP (ref 3.5–5)
POTASSIUM SERPL-SCNC: 4.2 MMOL/L — SIGNIFICANT CHANGE UP (ref 3.5–5)
RBC # BLD: 3.65 M/UL — LOW (ref 4.7–6.1)
RBC # BLD: 3.71 M/UL — LOW (ref 4.7–6.1)
RBC # FLD: 11.9 % — SIGNIFICANT CHANGE UP (ref 11.5–14.5)
RBC # FLD: 12.4 % — SIGNIFICANT CHANGE UP (ref 11.5–14.5)
SODIUM SERPL-SCNC: 132 MMOL/L — LOW (ref 135–146)
SODIUM SERPL-SCNC: 135 MMOL/L — SIGNIFICANT CHANGE UP (ref 135–146)
SODIUM SERPL-SCNC: 137 MMOL/L — SIGNIFICANT CHANGE UP (ref 135–146)
SODIUM SERPL-SCNC: 139 MMOL/L — SIGNIFICANT CHANGE UP (ref 135–146)
WBC # BLD: 10.4 K/UL — SIGNIFICANT CHANGE UP (ref 4.8–10.8)
WBC # BLD: 11.36 K/UL — HIGH (ref 4.8–10.8)
WBC # FLD AUTO: 10.4 K/UL — SIGNIFICANT CHANGE UP (ref 4.8–10.8)
WBC # FLD AUTO: 11.36 K/UL — HIGH (ref 4.8–10.8)

## 2024-09-23 PROCEDURE — 70450 CT HEAD/BRAIN W/O DYE: CPT | Mod: 26

## 2024-09-23 RX ORDER — SODIUM PHOSPHATE IN D5W 15MMOL/250
15 PLASTIC BAG, INJECTION (ML) INTRAVENOUS ONCE
Refills: 0 | Status: COMPLETED | OUTPATIENT
Start: 2024-09-23 | End: 2024-09-23

## 2024-09-23 RX ORDER — ENOXAPARIN SODIUM 150 MG/ML
40 INJECTION SUBCUTANEOUS EVERY 24 HOURS
Refills: 0 | Status: DISCONTINUED | OUTPATIENT
Start: 2024-09-23 | End: 2024-09-24

## 2024-09-23 RX ORDER — LEVETIRACETAM 1000 MG
750 TABLET ORAL EVERY 12 HOURS
Refills: 0 | Status: DISCONTINUED | OUTPATIENT
Start: 2024-09-22 | End: 2024-09-24

## 2024-09-23 RX ORDER — SODIUM CHLORIDE 0.9 % (FLUSH) 0.9 %
1000 SYRINGE (ML) INJECTION
Refills: 0 | Status: DISCONTINUED | OUTPATIENT
Start: 2024-09-23 | End: 2024-09-24

## 2024-09-23 RX ADMIN — Medication 2: at 11:58

## 2024-09-23 RX ADMIN — ENOXAPARIN SODIUM 40 MILLIGRAM(S): 150 INJECTION SUBCUTANEOUS at 12:00

## 2024-09-23 RX ADMIN — Medication 2: at 08:44

## 2024-09-23 RX ADMIN — THIAMINE HYDROCHLORIDE 100 MILLIGRAM(S): 100 INJECTION, SOLUTION INTRAMUSCULAR; INTRAVENOUS at 11:58

## 2024-09-23 RX ADMIN — CHLORHEXIDINE GLUCONATE ORAL RINSE 1 APPLICATION(S): 1.2 SOLUTION DENTAL at 05:08

## 2024-09-23 RX ADMIN — Medication 5 MILLIGRAM(S): at 05:08

## 2024-09-23 RX ADMIN — Medication 63.75 MILLIMOLE(S): at 03:04

## 2024-09-23 RX ADMIN — Medication 500 MILLIGRAM(S): at 05:08

## 2024-09-23 RX ADMIN — Medication 50 MILLIEQUIVALENT(S): at 02:41

## 2024-09-23 RX ADMIN — Medication 750 MILLIGRAM(S): at 05:08

## 2024-09-23 RX ADMIN — Medication 500 MILLIGRAM(S): at 22:17

## 2024-09-23 RX ADMIN — Medication 100 MILLIGRAM(S): at 11:59

## 2024-09-23 RX ADMIN — FOLIC ACID 1 MILLIGRAM(S): 1 TABLET ORAL at 11:58

## 2024-09-23 RX ADMIN — Medication 750 MILLIGRAM(S): at 17:30

## 2024-09-23 RX ADMIN — Medication 650 MILLIGRAM(S): at 18:42

## 2024-09-23 RX ADMIN — Medication 5000 UNIT(S): at 05:07

## 2024-09-23 RX ADMIN — Medication 500 MILLIGRAM(S): at 13:11

## 2024-09-23 NOTE — CHART NOTE - NSCHARTNOTEFT_GEN_A_CORE
SICU Transfer Note    FRANCISCA COREY  64y (1960)  [id number]      Transfer from: SICU  Transfer to: Surgery-    HPI  64yM w/ PMHx of DM and HLD seen as a trauma consult s/p fall down 5 stairs in his home after drinking wine +HT, -LOC, -AC.  According to patient's wife, patient had about 2-3 glasses of wine and went down to the basement to watch TV when she heard a loud bang. She found the patient at the bottom of the stairs lying on his back. He was unable to get up on his own so EMS was called. Pt was examined at bedside, with no acute complaints at this time. Pt did not recall what happened but was awake, alert, and oriented (person, place, and time), Denied headache, dizziness, nausea, vomiting, blurry vision.     SICU COURSE:  In the SICU, pt remained on q1h neuro checks due to increasing and new intracranial     PAST MEDICAL & SURGICAL HISTORY:  Diabetes    Allergies - No Known Allergies; Intolerances      MEDICATIONS  (STANDING):  amLODIPine   Tablet 5 milliGRAM(s) Oral daily  cefTRIAXone   IVPB 1000 milliGRAM(s) IV Intermittent every 24 hours  chlorhexidine 2% Cloths 1 Application(s) Topical <User Schedule>  enoxaparin Injectable 40 milliGRAM(s) SubCutaneous every 24 hours  folic acid 1 milliGRAM(s) Oral daily  influenza   Vaccine 0.5 milliLiter(s) IntraMuscular once  insulin lispro (ADMELOG) corrective regimen sliding scale   SubCutaneous Before meals and at bedtime  levETIRAcetam 750 milliGRAM(s) Oral every 12 hours  methocarbamol 500 milliGRAM(s) Oral every 8 hours  sodium chloride 0.9%. 1000 milliLiter(s) (75 mL/Hr) IV Continuous <Continuous>  thiamine 100 milliGRAM(s) Oral daily    MEDICATIONS  (PRN):  acetaminophen     Tablet .. 650 milliGRAM(s) Oral every 6 hours PRN Mild Pain (1 - 3)  ondansetron Injectable 4 milliGRAM(s) IV Push every 6 hours PRN Nausea    Vital Signs Last 24 Hrs  T(C): 37.3 (23 Sep 2024 14:00), Max: 37.6 (22 Sep 2024 19:00)  T(F): 99.2 (23 Sep 2024 14:00), Max: 99.6 (22 Sep 2024 19:00)  HR: 74 (23 Sep 2024 16:00) (70 - 82)  BP: 146/72 (23 Sep 2024 16:00) (114/58 - 160/72)  BP(mean): 102 (23 Sep 2024 16:00) (80 - 114)  RR: 11 (23 Sep 2024 16:00) (11 - 22)  SpO2: 98% (23 Sep 2024 16:00) (95% - 99%)    Parameters below as of 23 Sep 2024 16:00  Patient On (Oxygen Delivery Method): room air      I&O's Summary    22 Sep 2024 07:01  -  23 Sep 2024 07:00  --------------------------------------------------------  IN: 2070 mL / OUT: 1301 mL / NET: 769 mL    23 Sep 2024 07:01  -  23 Sep 2024 16:48  --------------------------------------------------------  IN: 1235 mL / OUT: 450 mL / NET: 785 mL      LABS:                        12.0   10.40 )-----------( 199      ( 23 Sep 2024 00:32 )             36.1       09-23    135  |  104  |  9[L]  ----------------------------<  143[H]  4.2   |  18  |  0.6[L]    Ca    8.6      23 Sep 2024 11:50  Phos  2.8     09-23  Mg     2.3     09-23    TPro  8.0  /  Alb  4.7  /  TBili  0.5  /  DBili  x   /  AST  44[H]  /  ALT  29  /  AlkPhos  76  09-21      PT/INR - ( 22 Sep 2024 17:27 )   PT: 13.50 sec;   INR: 1.18 ratio         PTT - ( 22 Sep 2024 17:27 )  PTT:31.4 sec    Assessment & Plan:  64yM w/ PMHx of DM and HLD seen as a trauma consult s/p fall down 5 stairs in his home after drinking wine +HT, -LOC, -AC. SICU consulted for Q1 neuro checks in setting of acute intracranial bleed, multiple intracranial bleeds now stable. Pt d/g to 4C.     NEUROLOGICAL:  # acute subarachnoid hemorrhage  - Q1 neurovascular checks  - repeat CTH #2: increased SAH along the right anterior frontal convexity, new SAH along the left anterior convexity, new intraparenchymal hemorrhage, new thin SDH along left and probable right parietal convexities, similar SAH in right parietal calvarial fracture.   - repeat CTH #3 Increased size of right IPH, otherwise stable  - CTH #4 stable; Neurosurgery recs - no further Neurosurgical workup, q4h neuro checks. Keppra BID x 7 days total. Cleared for DVT ppx SQH. Neurosurgery Sign off, reconsult PRN  - keppra 750mg bid x 7 days  - SBP < 140  #Acute pain  - APAP 650mg q6h PRN  - robaxin 500mg q8h for moderate pain   #acute EtOH intoxication  - CIWA, ativan 2mg IV prn for CIWA > 8  - Thiamine, folic acid     RESPIRATORY   #Oxygenation  - currently saturating well on room air, wean as tolerated  - encourage IS, pulling 2.5L  #Activity  - increase as tolerated    CARDS:   #PMH HTN  - no home medications  - SBP goal 110-140    - hydralazine 5mg IVP x 1 given 9/22 for     - amlodipine 5mg daily  #PMH HLD   - holding home gemfibrozil 600mg TID    GASTROINTESTINAL/NUTRITION:   #Diet, Clear Liquid Diet; Consistent Carbohydrate    -aspiration precautions, HOB 30  #Acute uncomplicated diverticulitis   - asymptomatic, no prior episodes, never had colonoscopy  - colonoscopy outpatient in 6-8 weeks   #GI Prophylaxis     -not indicated  #Bowel regimen    -none     -last bowel movement 9/21    /RENAL:   #urine output in critically ill    -voiding freely    Labs:   BUN/Cr- 9/0.7  -->,  10/0.7  -->  Electrolytes-(09-23 @ 00:32)Na 139 // K 3.8 // Mg 2.3 // Phos 2.8     #maintain euvolemia   - IV lock   - sodium goal 140-150  #hypophosphatemia  - repleted with 15mEq NaPhosp  #hypokalemia  - repleted with 20mEq potassium chloride       HEME/ONC:   #DVT prophylaxis     -Chemical: Lovenox 40mg QD     -Mechanical: SCDs    Labs: Hb/Hct:  12.5/37.8  (09-22 @ 17:27)  -->,  12.0/36.1  (09-23 @ 00:32)  -->                      Plts:  206  -->,  199  -->                 PTT/INR:  32.9/1.22  --->,  31.4/1.18  --->     Blood Consent-obtain if acute anemia, q6 CBC    ID:  #monitor leukocytosis   - continue to monitor with daily labs   - monitor for fevers  #DTI   - no DTI seen on exam 9/21  #diverticulitis - asymptomatic  #UTI   - rocephin x 3 days  WBC- 9.36  --->>,  10.08  --->>,  10.40  --->>  Temp trend- 24hrs T(F): 99.4 (09-23 @ 00:00), Max: 100.4 (09-22 @ 03:30)  Current antibiotics - rocephin x 3 days    ENDOCRINE:  #PMH diabetes  - insulin sliding scale  - blood glucose AC/HS  - Glucose goal 140-180.   - holding home metformin, glyburide, gemfibrozil    MSK:   - activity: ambulate as tolerated       LINES/DRAINS:  PIV    ADVANCED DIRECTIVES:  Full Code    HCP/Emergency Contact -    INDICATION FOR SICU: Q1 neurovascular checks in setting of acute intracranial bleed      DISPO: 4C    Follow Up:  - 20:00 labs  - PT consult  - Advance diet as tolerated  - outpatient colonoscopy in 6-8w    Signed out to: Saira Walters MD  Date: 9/23/24  Time: 1675

## 2024-09-23 NOTE — CONSULT NOTE ADULT - REASON FOR ADMISSION
SAH, parietal skull fx s/p fall down stairs
SAH, parietal skull fx s/p fall down stairs
fell down 5 stairs, traumatic SAH, calverium fracture

## 2024-09-23 NOTE — PROGRESS NOTE ADULT - ASSESSMENT
64yM w/ PMHx of DM and HLD seen as a trauma consult s/p fall down 5 stairs in his home after drinking wine +HT, -LOC, -AC.  According to patient's wife, patient had about 2-3 glasses of wine and went down to the basement to watch TV when she heard a loud bang. She found the patient at the bottom of the stairs lying on his back. He was unable to get up on his own so EMS was called. Pt does not recall what happened. He does not complain of any pain at this time. Pt was evaluated by neurosurgery with recommendations for repeat head CT, Q1 neuro checks, keppra, SBP goal <140.    SICU consulted for Q1 neuro checks in setting of acute intracranial bleed.     NEUROLOGICAL:  # acute subarachnoid hemorrhage  - Q1 neurovascular checks  - repeat CTH #2: increased SAH along the right anterior frontal convexity, new SAH along the left anterior convexity, new intraparenchymal hemorrhage, new thin SDH along left and probable right parietal convexities, similar SAH in right parietal calvarial fracture.   - repeat CTH #3 Increased size of right IPH, otherwise stable   - keppra 750mg bid   - SBP <140  - DDVAP 32mcg x1 given  - TXA 1g x1 given  #Acute pain    -APAP 650mg ATC    - Gabapentin 100mg q8    - robaxin 500mg q8h for moderate pain    #acute ETOH intoxication     - ativan 2mg IV prn for CIWA >8    -  CIWA      - Thiamine, folic acid      RESPIRATORY   #Oxygenation    -currently saturating well on room air, wean as tolerated   - encourage IS, pulling 2.5L  #Activity    -increase as tolerated    CARDS:   #PMH HTN    - no home medications    - SBP goal 110-140     - hydralazine 5mg IVP x 1 given 9/22 for     - hydralazine 10mg IVP x1 given 9/22 for     - amliodipine 5mg daily  #PMH HLD   - holding home gemfibrozil 600mg TID    GASTROINTESTINAL/NUTRITION:   #Diet, Clear Liquid Diet; Consistent Carbohydrate    -aspiration precautions, HOB 30  #Acute uncomplicated diverticulitis     -asymptomatic, no prior episodes, never had colonoscopy   - colonoscopy outpatient in 6-8 weeks   #GI Prophylaxis     -not indicated  #Bowel regimen    -none     -last bowel movement 9/21    /RENAL:   #urine output in critically ill    -voiding freely    Labs:   BUN/Cr- 9/0.7  -->,  10/0.7  -->  Electrolytes-(09-23 @ 00:32)Na 139 // K 3.8 // Mg 2.3 // Phos 2.8     #maintain euvolemia     -IV lock     -sodium goal 140-150  #hypophosphatemia   -repleted with 15mEq NaPhosp  #hypokalemia   - repleted with 20mEq potassium chloride       HEME/ONC:   #DVT prophylaxis     -Chemical: holding      -Mechanical: SCDs    Labs: Hb/Hct:  12.5/37.8  (09-22 @ 17:27)  -->,  12.0/36.1  (09-23 @ 00:32)  -->                      Plts:  206  -->,  199  -->                 PTT/INR:  32.9/1.22  --->,  31.4/1.18  --->     Blood Consent-obtain if acute anemia, q6 CBC    ID:  #monitor leukocytosis   - continue to monitor with daily labs   - monitor for fevers  #DTI   - no DTI seen on exam 9/21  #diverticulitis - asymptomatic  #UTI   - rocephin x 3 days  WBC- 9.36  --->>,  10.08  --->>,  10.40  --->>  Temp trend- 24hrs T(F): 99.4 (09-23 @ 00:00), Max: 100.4 (09-22 @ 03:30)  Current antibiotics- rocephin x 3 days    ENDOCRINE:  #[facility] diabetes    -ISS     -FSG q6 if NPO     -Glucose goal 140-180.     -holding home metformin, glyburide, gemfibrozil    MSK:   - activity: ambulate as tolerated       LINES/DRAINS:  PIV    ADVANCED DIRECTIVES:  Full Code    HCP/Emergency Contact-    INDICATION FOR SICU: Q1 neurovascular checks in setting of acute intracranial bleed      DISPO:   Case to be discussed with attending Dr. Skaggs   64yM w/ PMHx of DM and HLD seen as a trauma consult s/p fall down 5 stairs in his home after drinking wine +HT, -LOC, -AC.  According to patient's wife, patient had about 2-3 glasses of wine and went down to the basement to watch TV when she heard a loud bang. She found the patient at the bottom of the stairs lying on his back. He was unable to get up on his own so EMS was called. Pt does not recall what happened. He does not complain of any pain at this time. Pt was evaluated by neurosurgery with recommendations for repeat head CT, Q1 neuro checks, keppra, SBP goal <140.    SICU consulted for Q1 neuro checks in setting of acute intracranial bleed.     NEUROLOGICAL:  # acute subarachnoid hemorrhage  - Q1 neurovascular checks  - repeat CTH #2: increased SAH along the right anterior frontal convexity, new SAH along the left anterior convexity, new intraparenchymal hemorrhage, new thin SDH along left and probable right parietal convexities, similar SAH in right parietal calvarial fracture.   - repeat CTH #3 Increased size of right IPH, otherwise stable   - keppra 750mg bid   - SBP <140  #Acute pain    -APAP 650mg ATC    - Gabapentin 100mg q8    - robaxin 500mg q8h for moderate pain    #acute ETOH intoxication     - ativan 2mg IV prn for CIWA >8    -  CIWA      - Thiamine, folic acid      RESPIRATORY   #Oxygenation    -currently saturating well on room air, wean as tolerated   - encourage IS, pulling 2.5L  #Activity    -increase as tolerated    CARDS:   #PMH HTN    - no home medications    - SBP goal 110-140     - amlodipine 5mg daily  #PMH HLD   - holding home gemfibrozil 600mg TID    GASTROINTESTINAL/NUTRITION:   #Diet, Clear Liquid Diet; Consistent Carbohydrate    -aspiration precautions, HOB 30  #Acute uncomplicated diverticulitis     -asymptomatic, no prior episodes, never had colonoscopy   - colonoscopy outpatient in 6-8 weeks   #GI Prophylaxis     -not indicated  #Bowel regimen    -none     -last bowel movement 9/21    /RENAL:   #urine output in critically ill    -voiding freely    Labs:   BUN/Cr- 9/0.7  -->,  10/0.7  -->  Electrolytes-(09-23 @ 00:32)Na 139 // K 3.8 // Mg 2.3 // Phos 2.8     #maintain euvolemia     -IV lock     -sodium goal 140-150  #hypophosphatemia   -repleted with 15mEq NaPhos  #hypokalemia   - repleted with 20mEq potassium chloride       HEME/ONC:   #DVT prophylaxis     -Chemical: lovenox 40mg QD approved by neurosurgery and SICU attending     -Mechanical: SCDs    Labs: Hb/Hct:  12.5/37.8  (09-22 @ 17:27)  -->,  12.0/36.1  (09-23 @ 00:32)  -->                      Plts:  206  -->,  199  -->                 PTT/INR:  32.9/1.22  --->,  31.4/1.18  --->     Blood Consent-obtain if acute anemia, q6 CBC    ID:  #monitor leukocytosis   - continue to monitor with daily labs   - monitor for fevers  #DTI   - no DTI seen on exam 9/21  #diverticulitis - asymptomatic  #UTI   - rocephin x 3 days  WBC- 9.36  --->>,  10.08  --->>,  10.40  --->>  Temp trend- 24hrs T(F): 99.4 (09-23 @ 00:00), Max: 100.4 (09-22 @ 03:30)  Current antibiotics- rocephin x 3 days    ENDOCRINE:  #PMH diabetes    -AC/HS, insulin sliding scale    -Glucose goal 140-180.     -holding home metformin, glyburide, gemfibrozil    MSK:   - activity: ambulate as tolerated       LINES/DRAINS:  PIV    ADVANCED DIRECTIVES:  Full Code    HCP/Emergency Contact-    INDICATION FOR SICU: Q1 neurovascular checks in setting of acute intracranial bleed      DISPO: SICU    Case to be discussed with attending Dr. Schafer   64yM w/ PMHx of DM and HLD seen as a trauma consult s/p fall down 5 stairs in his home after drinking wine +HT, -LOC, -AC.  According to patient's wife, patient had about 2-3 glasses of wine and went down to the basement to watch TV when she heard a loud bang. She found the patient at the bottom of the stairs lying on his back. He was unable to get up on his own so EMS was called. Pt does not recall what happened. He does not complain of any pain at this time. Pt was evaluated by neurosurgery with recommendations for repeat head CT, Q1 neuro checks, keppra, SBP goal <140.    SICU consulted for Q1 neuro checks in setting of acute intracranial bleed.     NEUROLOGICAL:  # acute subarachnoid hemorrhage  - Q1 neurovascular checks  - repeat CTH #2: increased SAH along the right anterior frontal convexity, new SAH along the left anterior convexity, new intraparenchymal hemorrhage, new thin SDH along left and probable right parietal convexities, similar SAH in right parietal calvarial fracture.   - repeat CTH #3 Increased size of right IPH, otherwise stable  - pending repeat CTH per Neurosurgery  - keppra 750mg bid   - SBP <140  #Acute pain    -APAP 650mg ATC    - Gabapentin 100mg q8    - robaxin 500mg q8h for moderate pain    #acute ETOH intoxication     - CIWA, ativan 2mg IV prn for CIWA >8     - Thiamine, folic acid      RESPIRATORY   #Oxygenation    -currently saturating well on room air, wean as tolerated   - encourage IS  #Activity    -increase as tolerated    CARDS:   #PMH HTN    - no home medications    - SBP goal 110-140     - amlodipine 5mg daily  #PMH HLD   - holding home gemfibrozil 600mg TID    GASTROINTESTINAL/NUTRITION:   #Diet, Clear Liquid Diet; Consistent Carbohydrate    -aspiration precautions, HOB 30  #Acute uncomplicated diverticulitis     -asymptomatic, no prior episodes, never had colonoscopy   - colonoscopy outpatient in 6-8 weeks   #GI Prophylaxis     -not indicated  #Bowel regimen    -none     -last bowel movement 9/21    /RENAL:   #urine output in critically ill    -voiding freely    Labs:   BUN/Cr- 9/0.7  -->,  10/0.7  -->  Electrolytes-(09-23 @ 00:32)Na 139 // K 3.8 // Mg 2.3 // Phos 2.8     #maintain euvolemia     -IV lock     -sodium goal 140-150  #hypophosphatemia   -repleted with 15mEq NaPhos  #hypokalemia   - repleted with 20mEq potassium chloride       HEME/ONC:   #DVT prophylaxis     -Chemical: lovenox 40mg QD approved by neurosurgery and SICU attending     -Mechanical: SCDs    Labs: Hb/Hct:  12.5/37.8  (09-22 @ 17:27)  -->,  12.0/36.1  (09-23 @ 00:32)  -->                      Plts:  206  -->,  199  -->                 PTT/INR:  32.9/1.22  --->,  31.4/1.18  --->     Blood Consent-obtain if acute anemia, q6 CBC    ID:  #monitor leukocytosis   - continue to monitor with daily labs   - monitor for fevers  #DTI   - no DTI seen on exam 9/21  #diverticulitis - asymptomatic  #UTI   - rocephin x 3 days  WBC- 9.36  --->>,  10.08  --->>,  10.40  --->>  Temp trend- 24hrs T(F): 99.4 (09-23 @ 00:00), Max: 100.4 (09-22 @ 03:30)  Current antibiotics- rocephin x 3 days    ENDOCRINE:  #PMH diabetes    -AC/HS, insulin sliding scale    -Glucose goal 140-180.     -holding home metformin, glyburide, gemfibrozil    MSK:   - activity: ambulate as tolerated       LINES/DRAINS:  PIV    ADVANCED DIRECTIVES:  Full Code    HCP/Emergency Contact-    INDICATION FOR SICU: Q1 neurovascular checks in setting of acute intracranial bleed      DISPO: SICU    Case to be discussed with attending Dr. Schafer   64yM w/ PMHx of DM and HLD seen as a trauma consult s/p fall down 5 stairs in his home after drinking wine +HT, -LOC, -AC.  According to patient's wife, patient had about 2-3 glasses of wine and went down to the basement to watch TV when she heard a loud bang. She found the patient at the bottom of the stairs lying on his back. He was unable to get up on his own so EMS was called. Pt does not recall what happened. He does not complain of any pain at this time. Pt was evaluated by neurosurgery with recommendations for repeat head CT, Q1 neuro checks, Keppra SBP goal <140.    SICU consulted for Q1 neuro checks in setting of acute intracranial bleed.     NEUROLOGICAL:  # acute subarachnoid hemorrhage  - Q1 neurovascular checks  - repeat CTH #2: increased SAH along the right anterior frontal convexity, new SAH along the left anterior convexity, new intraparenchymal hemorrhage, new thin SDH along left and probable right parietal convexities, similar SAH in right parietal calvarial fracture.   - repeat CTH #3 Increased size of right IPH, otherwise stable  - pending repeat CTH per Neurosurgery  - Keppra 750mg bid   - SBP <140  #Acute pain    -APAP 650mg aroun the clock     - Gabapentin 100mg q8    - Robaxin 500mg q8h for moderate pain    #acute ETOH intoxication     - CIWA, ativan 2mg IV prn for CIWA >8     - Thiamine, folic acid      RESPIRATORY   #Oxygenation    -currently saturating well on room air, wean as tolerated   - encourage IS  #Activity    -increase as tolerated    CARDS:   #PMH HTN    - no home medications    - SBP goal 110-140     - amlodipine 5mg daily  #PMH HLD   - holding home gemfibrozil 600mg TID    GASTROINTESTINAL/NUTRITION:   #Diet, Clear Liquid Diet; Consistent Carbohydrate    -aspiration precautions, HOB 30  #Acute uncomplicated diverticulitis     -asymptomatic, no prior episodes, never had colonoscopy   - colonoscopy outpatient in 6-8 weeks   #GI Prophylaxis     -not indicated  #Bowel regimen    -none     -last bowel movement 9/21    /RENAL:   #urine output in critically ill    -voiding freely    Labs:   BUN/Cr- 9/0.7  -->,  10/0.7  -->  Electrolytes-(09-23 @ 00:32)Na 139 // K 3.8 // Mg 2.3 // Phos 2.8     #maintain euvolemia     -IV lock     -sodium goal 140-150  #hypophosphatemia   -repleted with 15mEq NaPhos  #hypokalemia   - repleted with 20mEq potassium chloride       HEME/ONC:   #DVT prophylaxis     -Chemical: lovenox 40mg QD approved by neurosurgery and SICU attending     -Mechanical: SCDs    Labs: Hb/Hct:  12.5/37.8  (09-22 @ 17:27)  -->,  12.0/36.1  (09-23 @ 00:32)  -->                      Plts:  206  -->,  199  -->                 PTT/INR:  32.9/1.22  --->,  31.4/1.18  --->     Blood Consent-obtain if acute anemia, q6 CBC    ID:  #monitor leukocytosis   - continue to monitor with daily labs   - monitor for fevers  #DTI   - no DTI seen on exam 9/21  #diverticulitis - asymptomatic  #UTI   - Rocephin x 3 days  WBC- 9.36  --->>,  10.08  --->>,  10.40  --->>  Temp trend- 24hrs T(F): 99.4 (09-23 @ 00:00), Max: 100.4 (09-22 @ 03:30)  Current antibiotics- Rocephin x 3 days    ENDOCRINE:  #PMH diabetes    -AC/HS, insulin sliding scale    -Glucose goal 140-180.     -holding home metformin, glyburide, gemfibrozil    MSK:   - activity: ambulate as tolerated       LINES/DRAINS:  PIV    ADVANCED DIRECTIVES:  Full Code    HCP/Emergency Contact-    INDICATION FOR SICU: Q1 neurovascular checks in setting of acute intracranial bleed      DISPO: SICU  Case to be discussed with attending Dr. Schafer

## 2024-09-23 NOTE — PROGRESS NOTE ADULT - ASSESSMENT
ASSESSMENT:  64yM w/ PMHx of DM and HLD seen as a trauma consult s/p fall down 5 stairs in his home after drinking wine +HT, -LOC, -AC. Patient found to have parietal skull fx with frontal SAH/IPH.     PLAN:  - Diet: CLD, ADAT  - multimodal pain control   - monitor labs, replete prn  - OOB/ambulate as tolerated  - f/u neurosurgery recs -> q1hr neuro checks, repeat CT head Tuesday, ok for DVT ppx, keppra x7 days for seizure prophylaxis  - DVT ppx: SQH, ok per neurosurgery  - PT/Physiatry eval pending    Plan discussed with Attending Dr. Bolaños. Patient/family/team aware and in agreement with plan.     TAP [id number]

## 2024-09-23 NOTE — CHART NOTE - NSCHARTNOTEFT_GEN_A_CORE
Neurosurgery Sign Off Note    Rpt CTH 9/23 reviewed demonstrating no significant change in appearance of acute intracranial hemorrhages without significant mass effect or midline shift.  Stable neurological exam.     Rpt CTH reviewed  In setting of stable neurological exam and CTH no further Neurosurgical workup  Please liberalize Neurological checks  Keppra BID x 7 days total  Cleared for DVT ppx SQH  Neurosurgery Sign off, reconsult PRN Neurosurgery Sign Off Note    Rpt CTH 9/23 reviewed demonstrating no significant change in appearance of acute intracranial hemorrhages without significant mass effect or midline shift.  Stable neurological exam.     Rpt CTH reviewed  In setting of stable neurological exam and CTH no further Neurosurgical workup  Please liberalize Neurological checks to q4hrs   Keppra BID x 7 days total  Cleared for DVT ppx SQH  Neurosurgery Sign off, reconsult PRN

## 2024-09-23 NOTE — PROGRESS NOTE ADULT - SUBJECTIVE AND OBJECTIVE BOX
FRANCISCA COREY   [id number]   [birth year]  64yM    ============================   SICU Consult for Q1 neurovascular checks in setting of acute intracranial bleed   24 Hour Events  9/22  NIGHT  PM Rounds: no headaches/blurry vision, 97% on RA, /72, HR 77, A&O x3, following commands  - will recheck BP (goal <140)  - ok to r/s DVT ppx per nsx and atanassov  Day  d/c dilaudid  voiding   f/u NSX   IVL  start CLD   d/c zosyn, start rocephin x 3 days  hydralazine 5mg IVP x1 given for   hydralazine 10mg IVP x1 given for   Na 142  - 3% saline d/c'd  DVT ppx ok per nsgy, keep q1 neurochecks     ============================       HPI     64yM w/ PMHx of DM and HLD seen as a trauma consult s/p fall down 5 stairs in his home after drinking wine +HT, -LOC, -AC.  According to patient's wife, patient had about 2-3 glasses of wine and went down to the basement to watch TV when she heard a loud bang. She found the patient at the bottom of the stairs lying on his back. He was unable to get up on his own so EMS was called. Pt was examined at bedside, with no acute complaints at this time. Pt does not recall what happened but is awake, alert, and oriented (person, place, and time)  Denies headache, dizziness, nausea, vomiting, blurry vision.   SICU consulted for Q1 neuro checks in setting of acute intracranial bleed.     Pertinent Imaging  < from: CT Head No Cont (09.21.24 @ 19:46) >  Impression:  CT head:  Acute, nondepressed calvarial fracture of high right parietal bone.  Acute subarachnoid hemorrhage along right inferior frontal sulci.  CT cervical spine:  No evidence of acute fracture of the cervical spine.  < end of copied text >    9/21: Admitted to SICU for calvarial fracture of high right parietal bone and right SAH. CTH #2: increased SAH along the right anterior frontal convexity, new SAH along the left anterior convexity, new intraparenchymal hemorrhage, new thin SDH along left and probable right parietal convexities, similar SAH in right parietal calvarial fracture. Sodium goal 140-150 per NSGY started on hypertonic saline @50 + NS @ 100, TXA 1gx1 & DDAVP 32mcg x1, thiamine, folic acid and CIWA ordered.   9/22: CTH #3 stable, CLD, hydralazine ordered x 2 for SBP > 140 (goal per NSICU and NSGY 110-140), rocephin for UTI. DVT ppx started per NSGY    [X] A ten-point review of systems was otherwise negative except as noted above.  [  ] Due to altered mental status/intubation, subjective information was not attained from the patient. History was obtained, to the extent possible, from review of the chart and collateral sources of information.  ====================================================================   FRANCISCA COREY   [id number]   [birth year]  64yM    ============================   SICU Consult for Q1 neurovascular checks in setting of acute intracranial bleed   24 Hour Events  9/22  NIGHT  PM Rounds: no headaches/blurry vision, 97% on RA, /72, HR 77, A&O x3, following commands  - will recheck BP (goal <140)  - ok to r/s DVT ppx per nsx and atanassov  Day  d/c dilaudid  voiding   f/u NSX   IVL  start CLD   d/c zosyn, start rocephin x 3 days  hydralazine 5mg IVP x1 given for   hydralazine 10mg IVP x1 given for   Na 142  - 3% saline d/c'd  DVT ppx ok per nsgy, keep q1 neurochecks     ============================   SICU Course:   9/21: Admitted to SICU for calvarial fracture of high right parietal bone and right SAH. CTH #2: increased SAH along the right anterior frontal convexity, new SAH along the left anterior convexity, new intraparenchymal hemorrhage, new thin SDH along left and probable right parietal convexities, similar SAH in right parietal calvarial fracture. Sodium goal 140-150 per NSGY started on hypertonic saline @50 + NS @ 100, TXA 1gx1 & DDAVP 32mcg x1, thiamine, folic acid and CIWA ordered.   9/22: CTH #3 stable, CLD, hydralazine ordered x 2 for SBP > 140 (goal per NSICU and NSGY 110-140), rocephin for UTI. DVT ppx started per NSGY    [X] A ten-point review of systems was otherwise negative except as noted above.  [  ] Due to altered mental status/intubation, subjective information was not attained from the patient. History was obtained, to the extent possible, from review of the chart and collateral sources of information.  ====================================================================  Daily     Daily     Diet, Consistent Carbohydrate Clear Liquid (09-22-24 @ 10:51)      CURRENT MEDS:  Neurologic Medications  acetaminophen     Tablet .. 650 milliGRAM(s) Oral every 6 hours PRN Mild Pain (1 - 3)  levETIRAcetam 750 milliGRAM(s) Oral two times a day  methocarbamol 500 milliGRAM(s) Oral every 8 hours  ondansetron Injectable 4 milliGRAM(s) IV Push every 6 hours PRN Nausea    Respiratory Medications    Cardiovascular Medications  amLODIPine   Tablet 5 milliGRAM(s) Oral daily    Gastrointestinal Medications  folic acid 1 milliGRAM(s) Oral daily  sodium chloride 0.9%. 1000 milliLiter(s) IV Continuous <Continuous>  thiamine 100 milliGRAM(s) Oral daily    Genitourinary Medications    Hematologic/Oncologic Medications  enoxaparin Injectable 40 milliGRAM(s) SubCutaneous every 24 hours  influenza   Vaccine 0.5 milliLiter(s) IntraMuscular once    Antimicrobial/Immunologic Medications  cefTRIAXone   IVPB 1000 milliGRAM(s) IV Intermittent every 24 hours    Endocrine/Metabolic Medications  insulin lispro (ADMELOG) corrective regimen sliding scale   SubCutaneous Before meals and at bedtime    Topical/Other Medications  chlorhexidine 2% Cloths 1 Application(s) Topical <User Schedule>      ICU Vital Signs Last 24 Hrs  T(C): 37 (23 Sep 2024 04:00), Max: 37.9 (22 Sep 2024 16:00)  T(F): 98.6 (23 Sep 2024 04:00), Max: 100.3 (22 Sep 2024 16:00)  HR: 75 (23 Sep 2024 06:00) (68 - 82)  BP: 139/64 (23 Sep 2024 06:00) (123/58 - 169/80)  BP(mean): 92 (23 Sep 2024 06:00) (84 - 115)  ABP: --  ABP(mean): --  RR: 16 (23 Sep 2024 06:00) (12 - 20)  SpO2: 97% (23 Sep 2024 06:00) (93% - 99%)    O2 Parameters below as of 23 Sep 2024 04:00  Patient On (Oxygen Delivery Method): room air                  I&O's Summary    22 Sep 2024 07:01  -  23 Sep 2024 07:00  --------------------------------------------------------  IN: 2070 mL / OUT: 1301 mL / NET: 769 mL      I&O's Detail    22 Sep 2024 07:01  -  23 Sep 2024 07:00  --------------------------------------------------------  IN:    IV PiggyBack: 50 mL    IV PiggyBack: 300 mL    IV PiggyBack: 100 mL    Oral Fluid: 770 mL    sodium chloride 0.9%: 300 mL    sodium chloride 3%: 550 mL  Total IN: 2070 mL    OUT:    Voided (mL): 1301 mL  Total OUT: 1301 mL    Total NET: 769 mL              PHYSICAL EXAM:    General: Pt lying comfortably in bed.     Neuro:  GCS:     = E   / V   / M      Neuro: Alert & oriented x 3, no focal deficits. CNs II-XII intact. PERRLA, EOMs intact. Strength 5/5 throughout, sensory to light touch intact.     Lungs: Clear to auscultation bilaterally, normal expansion/effort. Oxygen delivery: ** . Pulling ** on IS.  Vent:     Cardiovascular : S1, S2.  Regular rate and rhythm. Cardiac Rhythm: NSR  Peripheral edema:     GI: BS x 4. Abdomen soft, Non-tender, Non-distended. Gastrostomy / Jejunostomy tube in place, dressing c/d/i.  Nasogastric tube in place.  Colostomy / Ileostomy.      Wound: ***    Extremities: Extremities warm, pink, well-perfused.        - Pulse exam: Radial palpable/ dopplerable bilaterally. DP/PT ** palpable/ dopplerable bilaterally.     Derm: Good skin turgor, no skin breakdown.       - DTI: ***    : Pinzon catheter in place/voiding freely.     CXR:     LABS:  CAPILLARY BLOOD GLUCOSE      POCT Blood Glucose.: 150 mg/dL (22 Sep 2024 21:10)  POCT Blood Glucose.: 161 mg/dL (22 Sep 2024 17:26)  POCT Blood Glucose.: 178 mg/dL (22 Sep 2024 11:41)                          12.0   10.40 )-----------( 199      ( 23 Sep 2024 00:32 )             36.1       09-23    137  |  106  |  9[L]  ----------------------------<  173[H]  4.2   |  20  |  0.7    Ca    8.3[L]      23 Sep 2024 05:42  Phos  2.8     09-23  Mg     2.3     09-23    TPro  8.0  /  Alb  4.7  /  TBili  0.5  /  DBili  x   /  AST  44[H]  /  ALT  29  /  AlkPhos  76  09-21      PT/INR - ( 22 Sep 2024 17:27 )   PT: 13.50 sec;   INR: 1.18 ratio         PTT - ( 22 Sep 2024 17:27 )  PTT:31.4 sec      Urinalysis Basic - ( 23 Sep 2024 05:42 )    Color: x / Appearance: x / SG: x / pH: x  Gluc: 173 mg/dL / Ketone: x  / Bili: x / Urobili: x   Blood: x / Protein: x / Nitrite: x   Leuk Esterase: x / RBC: x / WBC x   Sq Epi: x / Non Sq Epi: x / Bacteria: x         FRANCISCA COREY   [id number]   [birth year]  64yM    ============================   SICU Consult for Q1 neurovascular checks in setting of acute intracranial bleed   24 Hour Events  9/22  NIGHT  PM Rounds: no headaches/blurry vision, 97% on RA, /72, HR 77, A&O x3, following commands  - will recheck BP (goal <140)  - ok to r/s DVT ppx per nsx and atanassov  Day  d/c dilaudid  voiding   f/u NSX   IVL  start CLD   d/c zosyn, start rocephin x 3 days  hydralazine 5mg IVP x1 given for   hydralazine 10mg IVP x1 given for   Na 142  - 3% saline d/c'd  DVT ppx ok per nsgy, keep q1 neurochecks     ============================   SICU Course:   9/21: Admitted to SICU for calvarial fracture of high right parietal bone and right SAH. CTH #2: increased SAH along the right anterior frontal convexity, new SAH along the left anterior convexity, new intraparenchymal hemorrhage, new thin SDH along left and probable right parietal convexities, similar SAH in right parietal calvarial fracture. Sodium goal 140-150 per NSGY started on hypertonic saline @50 + NS @ 100, TXA 1gx1 & DDAVP 32mcg x1, thiamine, folic acid and CIWA ordered.   9/22: CTH #3 stable, CLD, hydralazine ordered x 2 for SBP > 140 (goal per NSICU and NSGY 110-140), rocephin for UTI. DVT ppx started per NSGY    [X] A ten-point review of systems was otherwise negative except as noted above.  [  ] Due to altered mental status/intubation, subjective information was not attained from the patient. History was obtained, to the extent possible, from review of the chart and collateral sources of information.  ====================================================================  Daily     Daily     Diet, Consistent Carbohydrate Clear Liquid (09-22-24 @ 10:51)      CURRENT MEDS:  Neurologic Medications  acetaminophen     Tablet .. 650 milliGRAM(s) Oral every 6 hours PRN Mild Pain (1 - 3)  levETIRAcetam 750 milliGRAM(s) Oral two times a day  methocarbamol 500 milliGRAM(s) Oral every 8 hours  ondansetron Injectable 4 milliGRAM(s) IV Push every 6 hours PRN Nausea    Respiratory Medications    Cardiovascular Medications  amLODIPine   Tablet 5 milliGRAM(s) Oral daily    Gastrointestinal Medications  folic acid 1 milliGRAM(s) Oral daily  sodium chloride 0.9%. 1000 milliLiter(s) IV Continuous <Continuous>  thiamine 100 milliGRAM(s) Oral daily    Genitourinary Medications    Hematologic/Oncologic Medications  enoxaparin Injectable 40 milliGRAM(s) SubCutaneous every 24 hours  influenza   Vaccine 0.5 milliLiter(s) IntraMuscular once    Antimicrobial/Immunologic Medications  cefTRIAXone   IVPB 1000 milliGRAM(s) IV Intermittent every 24 hours    Endocrine/Metabolic Medications  insulin lispro (ADMELOG) corrective regimen sliding scale   SubCutaneous Before meals and at bedtime    Topical/Other Medications  chlorhexidine 2% Cloths 1 Application(s) Topical <User Schedule>      ICU Vital Signs Last 24 Hrs  T(C): 37 (23 Sep 2024 04:00), Max: 37.9 (22 Sep 2024 16:00)  T(F): 98.6 (23 Sep 2024 04:00), Max: 100.3 (22 Sep 2024 16:00)  HR: 75 (23 Sep 2024 06:00) (68 - 82)  BP: 139/64 (23 Sep 2024 06:00) (123/58 - 169/80)  BP(mean): 92 (23 Sep 2024 06:00) (84 - 115)  ABP: --  ABP(mean): --  RR: 16 (23 Sep 2024 06:00) (12 - 20)  SpO2: 97% (23 Sep 2024 06:00) (93% - 99%)    O2 Parameters below as of 23 Sep 2024 04:00  Patient On (Oxygen Delivery Method): room air    I&O's Summary    22 Sep 2024 07:01  -  23 Sep 2024 07:00  --------------------------------------------------------  IN: 2070 mL / OUT: 1301 mL / NET: 769 mL      I&O's Detail    22 Sep 2024 07:01  -  23 Sep 2024 07:00  --------------------------------------------------------  IN:    IV PiggyBack: 50 mL    IV PiggyBack: 300 mL    IV PiggyBack: 100 mL    Oral Fluid: 770 mL    sodium chloride 0.9%: 300 mL    sodium chloride 3%: 550 mL  Total IN: 2070 mL    OUT:    Voided (mL): 1301 mL  Total OUT: 1301 mL    Total NET: 769 mL              PHYSICAL EXAM:    General: Pt lying comfortably in bed.     Neuro: Alert & oriented x 3, no focal deficits. CNs II-XII intact. PERRLA, EOMs intact. Strength 5/5 throughout, sensory to light touch intact.     Lungs: Clear to auscultation bilaterally, normal expansion/effort. Oxygen delivery: ** . Pulling ** on IS.  Vent:     Cardiovascular : S1, S2.  Regular rate and rhythm. Cardiac Rhythm: NSR  Peripheral edema:     GI: BS x 4. Abdomen soft, Non-tender, Non-distended. Gastrostomy / Jejunostomy tube in place, dressing c/d/i.  Nasogastric tube in place.  Colostomy / Ileostomy.      Wound: ***    Extremities: Extremities warm, pink, well-perfused.        - Pulse exam: Radial palpable/ dopplerable bilaterally. DP/PT ** palpable/ dopplerable bilaterally.     Derm: Good skin turgor, no skin breakdown.       - DTI: ***    : Pinzon catheter in place/voiding freely.     CXR:     LABS:  CAPILLARY BLOOD GLUCOSE      POCT Blood Glucose.: 150 mg/dL (22 Sep 2024 21:10)  POCT Blood Glucose.: 161 mg/dL (22 Sep 2024 17:26)  POCT Blood Glucose.: 178 mg/dL (22 Sep 2024 11:41)                          12.0   10.40 )-----------( 199      ( 23 Sep 2024 00:32 )             36.1       09-23    137  |  106  |  9[L]  ----------------------------<  173[H]  4.2   |  20  |  0.7    Ca    8.3[L]      23 Sep 2024 05:42  Phos  2.8     09-23  Mg     2.3     09-23    TPro  8.0  /  Alb  4.7  /  TBili  0.5  /  DBili  x   /  AST  44[H]  /  ALT  29  /  AlkPhos  76  09-21      PT/INR - ( 22 Sep 2024 17:27 )   PT: 13.50 sec;   INR: 1.18 ratio         PTT - ( 22 Sep 2024 17:27 )  PTT:31.4 sec      Urinalysis Basic - ( 23 Sep 2024 05:42 )    Color: x / Appearance: x / SG: x / pH: x  Gluc: 173 mg/dL / Ketone: x  / Bili: x / Urobili: x   Blood: x / Protein: x / Nitrite: x   Leuk Esterase: x / RBC: x / WBC x   Sq Epi: x / Non Sq Epi: x / Bacteria: x         FRANCISCA COREY   [id number]   [birth year]  64yM    ============================   SICU Consult for Q1 neurovascular checks in setting of acute intracranial bleed   24 Hour Events  9/22  NIGHT  PM Rounds: no headaches/blurry vision, 97% on RA, /72, HR 77, A&O x3, following commands  - will recheck BP (goal <140)  - ok to r/s DVT ppx per nsx and atanassov  Day  d/c dilaudid  voiding   f/u NSX   IVL  start CLD   d/c zosyn, start rocephin x 3 days  hydralazine 5mg IVP x1 given for   hydralazine 10mg IVP x1 given for   Na 142  - 3% saline d/c'd  DVT ppx ok per nsgy, keep q1 neurochecks     ============================   SICU Course:   9/21: Admitted to SICU for calvarial fracture of high right parietal bone and right SAH. CTH #2: increased SAH along the right anterior frontal convexity, new SAH along the left anterior convexity, new intraparenchymal hemorrhage, new thin SDH along left and probable right parietal convexities, similar SAH in right parietal calvarial fracture. Sodium goal 140-150 per NSGY started on hypertonic saline @50 + NS @ 100, TXA 1gx1 & DDAVP 32mcg x1, thiamine, folic acid and CIWA ordered.   9/22: CTH #3 stable, CLD, hydralazine ordered x 2 for SBP > 140 (goal per NSICU and NSGY 110-140), rocephin for UTI. DVT ppx started per NSGY    [X] A ten-point review of systems was otherwise negative except as noted above.  [  ] Due to altered mental status/intubation, subjective information was not attained from the patient. History was obtained, to the extent possible, from review of the chart and collateral sources of information.  ====================================================================  Daily     Daily     Diet, Consistent Carbohydrate Clear Liquid (09-22-24 @ 10:51)      CURRENT MEDS:  Neurologic Medications  acetaminophen     Tablet .. 650 milliGRAM(s) Oral every 6 hours PRN Mild Pain (1 - 3)  levETIRAcetam 750 milliGRAM(s) Oral two times a day  methocarbamol 500 milliGRAM(s) Oral every 8 hours  ondansetron Injectable 4 milliGRAM(s) IV Push every 6 hours PRN Nausea    Respiratory Medications    Cardiovascular Medications  amLODIPine   Tablet 5 milliGRAM(s) Oral daily    Gastrointestinal Medications  folic acid 1 milliGRAM(s) Oral daily  sodium chloride 0.9%. 1000 milliLiter(s) IV Continuous <Continuous>  thiamine 100 milliGRAM(s) Oral daily    Genitourinary Medications    Hematologic/Oncologic Medications  enoxaparin Injectable 40 milliGRAM(s) SubCutaneous every 24 hours  influenza   Vaccine 0.5 milliLiter(s) IntraMuscular once    Antimicrobial/Immunologic Medications  cefTRIAXone   IVPB 1000 milliGRAM(s) IV Intermittent every 24 hours    Endocrine/Metabolic Medications  insulin lispro (ADMELOG) corrective regimen sliding scale   SubCutaneous Before meals and at bedtime    Topical/Other Medications  chlorhexidine 2% Cloths 1 Application(s) Topical <User Schedule>      ICU Vital Signs Last 24 Hrs  T(C): 37 (23 Sep 2024 04:00), Max: 37.9 (22 Sep 2024 16:00)  T(F): 98.6 (23 Sep 2024 04:00), Max: 100.3 (22 Sep 2024 16:00)  HR: 75 (23 Sep 2024 06:00) (68 - 82)  BP: 139/64 (23 Sep 2024 06:00) (123/58 - 169/80)  BP(mean): 92 (23 Sep 2024 06:00) (84 - 115)  ABP: --  ABP(mean): --  RR: 16 (23 Sep 2024 06:00) (12 - 20)  SpO2: 97% (23 Sep 2024 06:00) (93% - 99%)    O2 Parameters below as of 23 Sep 2024 04:00  Patient On (Oxygen Delivery Method): room air    I&O's Summary    22 Sep 2024 07:01  -  23 Sep 2024 07:00  --------------------------------------------------------  IN: 2070 mL / OUT: 1301 mL / NET: 769 mL      I&O's Detail    22 Sep 2024 07:01  -  23 Sep 2024 07:00  --------------------------------------------------------  IN:    IV PiggyBack: 50 mL    IV PiggyBack: 300 mL    IV PiggyBack: 100 mL    Oral Fluid: 770 mL    sodium chloride 0.9%: 300 mL    sodium chloride 3%: 550 mL  Total IN: 2070 mL    OUT:    Voided (mL): 1301 mL  Total OUT: 1301 mL    Total NET: 769 mL    PHYSICAL EXAM:    General: Pt lying comfortably in bed.     Neuro: Alert & oriented x 3, no focal deficits. CNs II-XII intact. PERRLA, EOMs intact. Strength 5/5 throughout, sensory to light touch intact.     Lungs: Clear to auscultation bilaterally, normal expansion/effort. Oxygen delivery: room air. Pulling 1750mL on incentive spirometry.      Cardiovascular : S1, S2.  Regular rate and rhythm. Cardiac Rhythm: NSR    GI: Abdomen soft, Non-tender, Non-distended.     Extremities: Extremities warm, pink, well-perfused.     Derm: Good skin turgor, no skin breakdown.     : voiding freely.     LABS:  CAPILLARY BLOOD GLUCOSE      POCT Blood Glucose.: 150 mg/dL (22 Sep 2024 21:10)  POCT Blood Glucose.: 161 mg/dL (22 Sep 2024 17:26)  POCT Blood Glucose.: 178 mg/dL (22 Sep 2024 11:41)                          12.0   10.40 )-----------( 199      ( 23 Sep 2024 00:32 )             36.1       09-23    137  |  106  |  9[L]  ----------------------------<  173[H]  4.2   |  20  |  0.7    Ca    8.3[L]      23 Sep 2024 05:42  Phos  2.8     09-23  Mg     2.3     09-23    TPro  8.0  /  Alb  4.7  /  TBili  0.5  /  DBili  x   /  AST  44[H]  /  ALT  29  /  AlkPhos  76  09-21      PT/INR - ( 22 Sep 2024 17:27 )   PT: 13.50 sec;   INR: 1.18 ratio         PTT - ( 22 Sep 2024 17:27 )  PTT:31.4 sec      Urinalysis Basic - ( 23 Sep 2024 05:42 )    Color: x / Appearance: x / SG: x / pH: x  Gluc: 173 mg/dL / Ketone: x  / Bili: x / Urobili: x   Blood: x / Protein: x / Nitrite: x   Leuk Esterase: x / RBC: x / WBC x   Sq Epi: x / Non Sq Epi: x / Bacteria: x         FRANCISCA COREY   [id number]   [birth year]  64yM    ============================   SICU Consult for Q1 neurovascular checks in setting of acute intracranial bleed   24 Hour Events  9/22  NIGHT  PM Rounds: no headaches/blurry vision, 97% on RA, /72, HR 77, AA&O x3, following commands  - will recheck BP (goal <140)  - ok to r/s DVT ppx per neurosurgery and Atanassov  Day  d/c Dilaudid  voiding   f/u NSX   IVL  start CLD   d/c zosyn, start Rocephin x 3 days  hydralazine 5mg IVP x1 given for   hydralazine 10mg IVP x1 given for   Na 142  - 3% saline d/c'd  DVT ppx ok per nsgy, keep q1 neurochecks     ============================   SICU Course:   9/21: Admitted to SICU for calvarial fracture of high right parietal bone and right SAH. CTH #2: increased SAH along the right anterior frontal convexity, new SAH along the left anterior convexity, new intraparenchymal hemorrhage, new thin SDH along left and probable right parietal convexities, similar SAH in right parietal calvarial fracture. Sodium goal 140-150 per NSGY started on hypertonic saline @50 + NS @ 100, TXA 1gx1 & DDAVP 32mcg x1, thiamine, folic acid and CIWA ordered.   9/22: CTH #3 stable, CLD, hydralazine ordered x 2 for SBP > 140 (goal per NSICU and NSGY 110-140), rocephin for UTI. DVT ppx started per NSGY    [X] A ten-point review of systems was otherwise negative except as noted above.  [  ] Due to altered mental status/intubation, subjective information was not attained from the patient. History was obtained, to the extent possible, from review of the chart and collateral sources of information.  ====================================================================  Daily       Diet, Consistent Carbohydrate Clear Liquid (09-22-24 @ 10:51)    CURRENT MEDS:  Neurologic Medications  acetaminophen     Tablet .. 650 milliGRAM(s) Oral every 6 hours PRN Mild Pain (1 - 3)  levETIRAcetam 750 milliGRAM(s) Oral two times a day  methocarbamol 500 milliGRAM(s) Oral every 8 hours  ondansetron Injectable 4 milliGRAM(s) IV Push every 6 hours PRN Nausea    Respiratory Medications    Cardiovascular Medications  amLODIPine   Tablet 5 milliGRAM(s) Oral daily    Gastrointestinal Medications  folic acid 1 milliGRAM(s) Oral daily  sodium chloride 0.9%. 1000 milliLiter(s) IV Continuous <Continuous>  thiamine 100 milliGRAM(s) Oral daily    Genitourinary Medications    Hematologic/Oncologic Medications  enoxaparin Injectable 40 milliGRAM(s) SubCutaneous every 24 hours  influenza   Vaccine 0.5 milliLiter(s) IntraMuscular once    Antimicrobial/Immunologic Medications  cefTRIAXone   IVPB 1000 milliGRAM(s) IV Intermittent every 24 hours    Endocrine/Metabolic Medications  insulin lispro (ADMELOG) corrective regimen sliding scale   SubCutaneous Before meals and at bedtime    Topical/Other Medications  chlorhexidine 2% Cloths 1 Application(s) Topical <User Schedule>      ICU Vital Signs Last 24 Hrs  T(C): 37 (23 Sep 2024 04:00), Max: 37.9 (22 Sep 2024 16:00)  T(F): 98.6 (23 Sep 2024 04:00), Max: 100.3 (22 Sep 2024 16:00)  HR: 75 (23 Sep 2024 06:00) (68 - 82)  BP: 139/64 (23 Sep 2024 06:00) (123/58 - 169/80)  BP(mean): 92 (23 Sep 2024 06:00) (84 - 115)  ABP: --  ABP(mean): --  RR: 16 (23 Sep 2024 06:00) (12 - 20)  SpO2: 97% (23 Sep 2024 06:00) (93% - 99%)    O2 Parameters below as of 23 Sep 2024 04:00  Patient On (Oxygen Delivery Method): room air    I&O's Summary    22 Sep 2024 07:01  -  23 Sep 2024 07:00  --------------------------------------------------------  IN: 2070 mL / OUT: 1301 mL / NET: 769 mL      I&O's Detail    22 Sep 2024 07:01  -  23 Sep 2024 07:00  --------------------------------------------------------  IN:    IV PiggyBack: 50 mL    IV PiggyBack: 300 mL    IV PiggyBack: 100 mL    Oral Fluid: 770 mL    sodium chloride 0.9%: 300 mL    sodium chloride 3%: 550 mL  Total IN: 2070 mL    OUT:    Voided (mL): 1301 mL  Total OUT: 1301 mL    Total NET: 769 mL    PHYSICAL EXAM:    General: Pt lying comfortably in bed.     Neuro: Alert & oriented x 3, no focal deficits. CNs II-XII intact. PERRLA, EOMs intact. Strength 5/5 throughout, sensory to light touch intact.     Lungs: Clear to auscultation bilaterally, normal expansion/effort. Oxygen delivery: room air. Pulling 1750mL on incentive spirometry.      Cardiovascular : S1, S2.  Regular rate and rhythm. Cardiac Rhythm: NSR    GI: Abdomen soft, Non-tender, Non-distended.     Extremities: Extremities warm, pink, well-perfused.     Derm: Good skin turgor, no skin breakdown.     : voiding freely.     LABS:  CAPILLARY BLOOD GLUCOSE  POCT Blood Glucose.: 150 mg/dL (22 Sep 2024 21:10)  POCT Blood Glucose.: 161 mg/dL (22 Sep 2024 17:26)  POCT Blood Glucose.: 178 mg/dL (22 Sep 2024 11:41)                          12.0   10.40 )-----------( 199      ( 23 Sep 2024 00:32 )             36.1       09-23    137  |  106  |  9[L]  ----------------------------<  173[H]  4.2   |  20  |  0.7    Ca    8.3[L]      23 Sep 2024 05:42  Phos  2.8     09-23  Mg     2.3     09-23    TPro  8.0  /  Alb  4.7  /  TBili  0.5  /  DBili  x   /  AST  44[H]  /  ALT  29  /  AlkPhos  76  09-21      PT/INR - ( 22 Sep 2024 17:27 )   PT: 13.50 sec;   INR: 1.18 ratio         PTT - ( 22 Sep 2024 17:27 )  PTT:31.4 sec      Urinalysis Basic - ( 23 Sep 2024 05:42 )    Color: x / Appearance: x / SG: x / pH: x  Gluc: 173 mg/dL / Ketone: x  / Bili: x / Urobili: x   Blood: x / Protein: x / Nitrite: x   Leuk Esterase: x / RBC: x / WBC x   Sq Epi: x / Non Sq Epi: x / Bacteria: x

## 2024-09-23 NOTE — CONSULT NOTE ADULT - SUBJECTIVE AND OBJECTIVE BOX
HPI:    64yM w/ PMHx of DM and HLD seen as a trauma consult s/p fall down 5 stairs in his home after drinking wine +HT, -LOC, -AC.  According to patient's wife, patient had about 2-3 glasses of wine and went down to the basement to watch TV when she heard a loud bang. She found the patient at the bottom of the stairs lying on his back. He was unable to get up on his own so EMS was called. He does not complain of any pain at this time. Trauma assessment in ED: ABCs intact , GCS 15 , AAOx3.   On admission, patient had CTH and Cspine which showed skull fracture ans SAH. Trauma activated.      < from: CT Head No Cont (09.21.24 @ 23:46) >  IMPRESSION:    In comparison to prior CT head examination performed on 9/21/2024 at 7:45   PM,    Increased subarachnoid hemorrhage along the right anterior frontal   convexity.  New subarachnoid hemorrhage along the left anterior frontal convexity.  New intraparenchymal hemorrhage within the bilateral anterior frontal   lobes.  New thin subdural hemorrhage along the left and probable right parietal   convexities.  Similar appearing subarachnoid hemorrhage in the right insular region.    Unchanged nondepressed high right parietal calvarial fracture.    < end of copied text >        PAST MEDICAL & SURGICAL HISTORY:  Diabetes          Hospital Course:    TODAY'S SUBJECTIVE & REVIEW OF SYMPTOMS:     Constitutional WNL   Cardio WNL   Resp WNL   GI WNL  Heme WNL  Endo WNL  Skin WNL  MSK WNL  Neuro WNL  Cognitive WNL  Psych WNL      MEDICATIONS  (STANDING):  amLODIPine   Tablet 5 milliGRAM(s) Oral daily  cefTRIAXone   IVPB 1000 milliGRAM(s) IV Intermittent every 24 hours  chlorhexidine 2% Cloths 1 Application(s) Topical <User Schedule>  enoxaparin Injectable 40 milliGRAM(s) SubCutaneous every 24 hours  folic acid 1 milliGRAM(s) Oral daily  influenza   Vaccine 0.5 milliLiter(s) IntraMuscular once  insulin lispro (ADMELOG) corrective regimen sliding scale   SubCutaneous Before meals and at bedtime  levETIRAcetam 750 milliGRAM(s) Oral two times a day  methocarbamol 500 milliGRAM(s) Oral every 8 hours  sodium chloride 0.9%. 1000 milliLiter(s) (75 mL/Hr) IV Continuous <Continuous>  thiamine 100 milliGRAM(s) Oral daily    MEDICATIONS  (PRN):  acetaminophen     Tablet .. 650 milliGRAM(s) Oral every 6 hours PRN Mild Pain (1 - 3)  ondansetron Injectable 4 milliGRAM(s) IV Push every 6 hours PRN Nausea      FAMILY HISTORY:      Allergies    No Known Allergies    Intolerances        SOCIAL HISTORY:    [  ] Etoh  [  ] Smoking  [  ] Substance abuse     Home Environment:  [   ] Home Alone  [ x  ] Lives with Family  [   ] Home Health Aid    Dwelling:  [   ] Apartment  [x   ] Private House  [   ] Adult Home  [   ] Skilled Nursing Facility      [   ] Short Term  [   ] Long Term  [  x ] Stairs       Elevator [   ]    FUNCTIONAL STATUS PTA: (Check all that apply)  Ambulation: [ x   ]Independent    [   ] Dependent     [   ] Non-Ambulatory  Assistive Device: [   ] SA Cane  [   ]  Q Cane  [   ] Walker  [   ]  Wheelchair  ADL : [  x ] Independent  [    ]  Dependent       Vital Signs Last 24 Hrs  T(C): 36.1 (23 Sep 2024 08:00), Max: 37.9 (22 Sep 2024 16:00)  T(F): 97 (23 Sep 2024 08:00), Max: 100.3 (22 Sep 2024 16:00)  HR: 76 (23 Sep 2024 12:00) (68 - 82)  BP: 130/66 (23 Sep 2024 12:00) (114/58 - 168/78)  BP(mean): 92 (23 Sep 2024 12:00) (80 - 114)  RR: 17 (23 Sep 2024 12:00) (12 - 22)  SpO2: 99% (23 Sep 2024 12:00) (95% - 99%)    Parameters below as of 23 Sep 2024 12:00  Patient On (Oxygen Delivery Method): room air          PHYSICAL EXAM: Awake & Alert  GENERAL: NAD  HEAD:  Normocephalic  CHEST/LUNG: Clear   HEART: S1S2+  ABDOMEN: Soft, Nontender  EXTREMITIES:  no calf tenderness, maribel knee abrasions    NERVOUS SYSTEM:  Cranial Nerves 2-12 intact [   ] Abnormal  [   ]  ROM: WFL all extremities [ x  ]  Abnormal [   ]  Motor Strength: WFL all extremities  [ x  ]  Abnormal [   ]  Sensation: intact to light touch [ x  ] Abnormal [   ]    FUNCTIONAL STATUS:  Bed Mobility: Independent [   ]  Supervision [   ]  Needs Assistance [ x  ]  N/A [   ]  Transfers: Independent [   ]  Supervision [   ]  Needs Assistance [   ]  N/A [   ]   Ambulation: Independent [   ]  Supervision [   ]  Needs Assistance [   ]  N/A [   ]  ADL: Independent [   ] Requires Assistance [   ] N/A [   ]      LABS:                        12.0   10.40 )-----------( 199      ( 23 Sep 2024 00:32 )             36.1     09-23    137  |  106  |  9[L]  ----------------------------<  173[H]  4.2   |  20  |  0.7    Ca    8.3[L]      23 Sep 2024 05:42  Phos  2.8     09-23  Mg     2.3     09-23    TPro  8.0  /  Alb  4.7  /  TBili  0.5  /  DBili  x   /  AST  44[H]  /  ALT  29  /  AlkPhos  76  09-21    PT/INR - ( 22 Sep 2024 17:27 )   PT: 13.50 sec;   INR: 1.18 ratio         PTT - ( 22 Sep 2024 17:27 )  PTT:31.4 sec  Urinalysis Basic - ( 23 Sep 2024 05:42 )    Color: x / Appearance: x / SG: x / pH: x  Gluc: 173 mg/dL / Ketone: x  / Bili: x / Urobili: x   Blood: x / Protein: x / Nitrite: x   Leuk Esterase: x / RBC: x / WBC x   Sq Epi: x / Non Sq Epi: x / Bacteria: x        RADIOLOGY & ADDITIONAL STUDIES:

## 2024-09-23 NOTE — PROGRESS NOTE ADULT - SUBJECTIVE AND OBJECTIVE BOX
Trauma Surgery Daily Progress Note  =====================================================  Patient: FRANCISCA COREY , 64y ([birth year])Male   MRN: [id number]  Location: [address]  Visit: 09-22-24 Inpatient  Date: 09-23-24 @ 07:23    Hospital Day #:3    Procedure/Dx/Injuries:  Parietal skull fracture. Frontal SAH.     Events of past 24 hours:  Neurosurgery cleared for chemical DVT ppx; maintain q1hr neuro checks. Sodium 142, hypertonic saline discontinued. Started on CLD which patient is tolerating with no issues. Patient with asymptomatic diverticulitis; d/c zosyn and started on rocephin for UTI on admission.  Patient A&Ox3, no complaints. Denies headaches, nausea, vomiting, blurry vision. CT head #3 with increase in IPH, otherwise stable.       PAST MEDICAL & SURGICAL HISTORY:  Diabetes    ALLERGIES:  No Known Allergies          --------------------------------------------------------------------------------------    MEDICATIONS:    Neurologic Medications  acetaminophen     Tablet .. 650 milliGRAM(s) Oral every 6 hours PRN Mild Pain (1 - 3)  levETIRAcetam 750 milliGRAM(s) Oral two times a day  methocarbamol 500 milliGRAM(s) Oral every 8 hours  ondansetron Injectable 4 milliGRAM(s) IV Push every 6 hours PRN Nausea    Respiratory Medications    Cardiovascular Medications  amLODIPine   Tablet 5 milliGRAM(s) Oral daily    Gastrointestinal Medications  folic acid 1 milliGRAM(s) Oral daily  thiamine 100 milliGRAM(s) Oral daily    Genitourinary Medications    Hematologic/Oncologic Medications  heparin   Injectable 5000 Unit(s) SubCutaneous every 8 hours  influenza   Vaccine 0.5 milliLiter(s) IntraMuscular once    Antimicrobial/Immunologic Medications  cefTRIAXone   IVPB 1000 milliGRAM(s) IV Intermittent every 24 hours    Endocrine/Metabolic Medications  insulin lispro (ADMELOG) corrective regimen sliding scale   SubCutaneous Before meals and at bedtime    Topical/Other Medications  chlorhexidine 2% Cloths 1 Application(s) Topical <User Schedule>    --------------------------------------------------------------------------------------    VITAL SIGNS:  T(C): 37 (09-23-24 @ 04:00), Max: 37.9 (09-22-24 @ 16:00)  HR: 75 (09-23-24 @ 06:00) (68 - 83)  BP: 139/64 (09-23-24 @ 06:00) (111/56 - 169/80)  RR: 16 (09-23-24 @ 06:00) (12 - 20)  SpO2: 97% (09-23-24 @ 06:00) (93% - 99%)  --------------------------------------------------------------------------------------    EXAM  General: NAD, resting in bed comfortably. A&Ox4.   Neuro: CN grossly intact, PERRLA.  Cardiac: S1, S2. pulse present   Respiratory: Nonlabored respirations, normal cw expansion. No signs of respiratory distress.   Abdomen: soft, nontender, nondistended.   Extremities: no deformities, moving all extremities spontaneously. Sensation intact    --------------------------------------------------------------------------------------    LABS                          12.0   10.40 )-----------( 199      ( 23 Sep 2024 00:32 )             36.1       09-23    137  |  106  |  9[L]  ----------------------------<  173[H]  4.2   |  20  |  0.7    Ca    8.3[L]      23 Sep 2024 05:42  Phos  2.8     09-23  Mg     2.3     09-23    TPro  8.0  /  Alb  4.7  /  TBili  0.5  /  DBili  x   /  AST  44[H]  /  ALT  29  /  AlkPhos  76  09-21        --------------------------------------------------------------------------------------    INS AND OUTS:    09-22-24 @ 07:01  -  09-23-24 @ 07:00  --------------------------------------------------------  IN: 2070 mL / OUT: 1301 mL / NET: 769 mL      --------------------------------------------------------------------------------------

## 2024-09-23 NOTE — PROGRESS NOTE ADULT - SUBJECTIVE AND OBJECTIVE BOX
Neurosurgery Progress Note    Pt seen and examined at the bedside by myself.  No major over night events.  At present time pt is resting in bed VSS.  Pt denies HA, N/V, dizziness, visual changes.  Pt with an intact neurological exam, AO3, OVALLES x5, SILT.        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 status unknown, initial encounter    Handoff    MEWS Score    Diabetes    Traumatic intracranial hemorrhage    SAH (subarachnoid hemorrhage)    Other fracture of skull    FALL    90+    Calvarial fracture    Diverticulitis    SysAdmin_VstLnk      s/p     Allergies    No Known Allergies    Intolerances        Vital Signs Last 24 Hrs  T(C): 36.1 (23 Sep 2024 08:00), Max: 37.9 (22 Sep 2024 16:00)  T(F): 97 (23 Sep 2024 08:00), Max: 100.3 (22 Sep 2024 16:00)  HR: 73 (23 Sep 2024 11:00) (68 - 82)  BP: 137/69 (23 Sep 2024 11:00) (114/58 - 169/80)  BP(mean): 97 (23 Sep 2024 11:00) (80 - 115)  RR: 13 (23 Sep 2024 11:00) (12 - 22)  SpO2: 98% (23 Sep 2024 11:00) (95% - 99%)      acetaminophen     Tablet .. 650 milliGRAM(s) Oral every 6 hours PRN  amLODIPine   Tablet 5 milliGRAM(s) Oral daily  cefTRIAXone   IVPB 1000 milliGRAM(s) IV Intermittent every 24 hours  chlorhexidine 2% Cloths 1 Application(s) Topical <User Schedule>  enoxaparin Injectable 40 milliGRAM(s) SubCutaneous every 24 hours  folic acid 1 milliGRAM(s) Oral daily  influenza   Vaccine 0.5 milliLiter(s) IntraMuscular once  insulin lispro (ADMELOG) corrective regimen sliding scale   SubCutaneous Before meals and at bedtime  levETIRAcetam 750 milliGRAM(s) Oral two times a day  methocarbamol 500 milliGRAM(s) Oral every 8 hours  ondansetron Injectable 4 milliGRAM(s) IV Push every 6 hours PRN  sodium chloride 0.9%. 1000 milliLiter(s) IV Continuous <Continuous>  thiamine 100 milliGRAM(s) Oral daily        09-22-24 @ 07:01 - 09-23-24 @ 07:00  --------------------------------------------------------  IN: 2070 mL / OUT: 1301 mL / NET: 769 mL    09-23-24 @ 07:01  - 09-23-24 @ 11:37  --------------------------------------------------------  IN: 225 mL / OUT: 200 mL / NET: 25 mL        REVIEW OF SYSTEMS    [ ] A ten-point review of systems was otherwise negative except as noted.  [ ] Due to altered mental status/intubation, subjective information were not able to be obtained from the patient. History was obtained, to the extent possible, from review of the chart and collateral sources of information.      Exam:  resting in bed comfortably   AAOX3. Verbal function intact  tongue midline, facial motions symmetric  PERRLA, EOMI  Pupils 3mm b/l   No Pronator Drift   Finger to Nose intact, no dysmetria   Motor: MAEx4, 5/5 power in b/l UE and LE  Sensation: SILT         CBC Full  -  ( 23 Sep 2024 00:32 )  WBC Count : 10.40 K/uL  RBC Count : 3.71 M/uL  Hemoglobin : 12.0 g/dL  Hematocrit : 36.1 %  Platelet Count - Automated : 199 K/uL  Mean Cell Volume : 97.3 fL  Mean Cell Hemoglobin : 32.3 pg  Mean Cell Hemoglobin Concentration : 33.2 g/dL  Auto Neutrophil # : 6.78 K/uL  Auto Lymphocyte # : 2.15 K/uL  Auto Monocyte # : 1.24 K/uL  Auto Eosinophil # : 0.13 K/uL  Auto Basophil # : 0.06 K/uL  Auto Neutrophil % : 65.1 %  Auto Lymphocyte % : 20.7 %  Auto Monocyte % : 11.9 %  Auto Eosinophil % : 1.3 %  Auto Basophil % : 0.6 %    09-23    137  |  106  |  9[L]  ----------------------------<  173[H]  4.2   |  20  |  0.7    Ca    8.3[L]      23 Sep 2024 05:42  Phos  2.8     09-23  Mg     2.3     09-23    TPro  8.0  /  Alb  4.7  /  TBili  0.5  /  DBili  x   /  AST  44[H]  /  ALT  29  /  AlkPhos  76  09-21    PT/INR - ( 22 Sep 2024 17:27 )   PT: 13.50 sec;   INR: 1.18 ratio         PTT - ( 22 Sep 2024 17:27 )  PTT:31.4 sec          Imaging:  < from: CT Head No Cont (09.22.24 @ 06:17) >    FINDINGS:  Stable bilateral frontal convexity acute subarachnoid hemorrhage. Stable   acute subarachnoid hemorrhage in the right sylvian fissure.    Increased size of right inferior frontal parenchymal hemorrhagic   contusion, now measuring 3.2 x 2.2 x 1.8 cm. Stable left inferior frontal   parenchymal hemorrhagic contusion.    Stable foci of acute subarachnoid/intraparenchymal hemorrhage along the   bilateral anteromedial temporal lobes.    Stable small foci of acute subarachnoid hemorrhage layering in the right   parietal sulci.    There is no CT evidence of acute transcortical infarct.    There is no hydrocephalus. Basal cisterns are patent.    The visualized paranasal sinuses and mastoid air cells are clear.    Redemonstration of superior right parietal nondisplaced calvarial   fracture.    IMPRESSION:  Multi-compartmental acute intracranial hemorrhages as described above.    --- End of Report ---          EDGARDO MARES MD; Resident Radiologist  This document has been electronically signed.  SHEILA DIAZ MD; Attending Radiologist  This document has been electronically signed. Sep 22 2024 11:46AM    < end of copied text >      Assessment/Plan:   64M PMH DM, HLD s/p fall down stairs + ETOH found with B/L frontal contusions, SAH and calvarium fracture      Stable Neurological exam  Please repeat CTH today for access stability  Keppra BID x 7 days total  SBP <150  Cleared for PO diet  OK for PT/OT rehab and OOB  Plan and care d/w Dr Ny           Neurosurgery Progress Note    Pt seen and examined at the bedside by myself.  No major over night events.  At present time pt is resting in bed VSS.  Pt denies HA, N/V, dizziness, visual changes.  Pt with an intact neurological exam, AO3, OVALLES x5, SILT.        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 status unknown, initial encounter    Handoff    MEWS Score    Diabetes    Traumatic intracranial hemorrhage    SAH (subarachnoid hemorrhage)    Other fracture of skull    FALL    90+    Calvarial fracture    Diverticulitis    SysAdmin_VstLnk      s/p     Allergies    No Known Allergies    Intolerances        Vital Signs Last 24 Hrs  T(C): 36.1 (23 Sep 2024 08:00), Max: 37.9 (22 Sep 2024 16:00)  T(F): 97 (23 Sep 2024 08:00), Max: 100.3 (22 Sep 2024 16:00)  HR: 73 (23 Sep 2024 11:00) (68 - 82)  BP: 137/69 (23 Sep 2024 11:00) (114/58 - 169/80)  BP(mean): 97 (23 Sep 2024 11:00) (80 - 115)  RR: 13 (23 Sep 2024 11:00) (12 - 22)  SpO2: 98% (23 Sep 2024 11:00) (95% - 99%)      acetaminophen     Tablet .. 650 milliGRAM(s) Oral every 6 hours PRN  amLODIPine   Tablet 5 milliGRAM(s) Oral daily  cefTRIAXone   IVPB 1000 milliGRAM(s) IV Intermittent every 24 hours  chlorhexidine 2% Cloths 1 Application(s) Topical <User Schedule>  enoxaparin Injectable 40 milliGRAM(s) SubCutaneous every 24 hours  folic acid 1 milliGRAM(s) Oral daily  influenza   Vaccine 0.5 milliLiter(s) IntraMuscular once  insulin lispro (ADMELOG) corrective regimen sliding scale   SubCutaneous Before meals and at bedtime  levETIRAcetam 750 milliGRAM(s) Oral two times a day  methocarbamol 500 milliGRAM(s) Oral every 8 hours  ondansetron Injectable 4 milliGRAM(s) IV Push every 6 hours PRN  sodium chloride 0.9%. 1000 milliLiter(s) IV Continuous <Continuous>  thiamine 100 milliGRAM(s) Oral daily        09-22-24 @ 07:01 - 09-23-24 @ 07:00  --------------------------------------------------------  IN: 2070 mL / OUT: 1301 mL / NET: 769 mL    09-23-24 @ 07:01  - 09-23-24 @ 11:37  --------------------------------------------------------  IN: 225 mL / OUT: 200 mL / NET: 25 mL        REVIEW OF SYSTEMS    [ ] A ten-point review of systems was otherwise negative except as noted.  [ ] Due to altered mental status/intubation, subjective information were not able to be obtained from the patient. History was obtained, to the extent possible, from review of the chart and collateral sources of information.      Exam:  resting in bed comfortably   AAOX3. Verbal function intact  tongue midline, facial motions symmetric  PERRLA, EOMI  Pupils 3mm b/l   No Pronator Drift   Finger to Nose intact, no dysmetria   Motor: MAEx4, 5/5 power in b/l UE and LE  Sensation: SILT         CBC Full  -  ( 23 Sep 2024 00:32 )  WBC Count : 10.40 K/uL  RBC Count : 3.71 M/uL  Hemoglobin : 12.0 g/dL  Hematocrit : 36.1 %  Platelet Count - Automated : 199 K/uL  Mean Cell Volume : 97.3 fL  Mean Cell Hemoglobin : 32.3 pg  Mean Cell Hemoglobin Concentration : 33.2 g/dL  Auto Neutrophil # : 6.78 K/uL  Auto Lymphocyte # : 2.15 K/uL  Auto Monocyte # : 1.24 K/uL  Auto Eosinophil # : 0.13 K/uL  Auto Basophil # : 0.06 K/uL  Auto Neutrophil % : 65.1 %  Auto Lymphocyte % : 20.7 %  Auto Monocyte % : 11.9 %  Auto Eosinophil % : 1.3 %  Auto Basophil % : 0.6 %    09-23    137  |  106  |  9[L]  ----------------------------<  173[H]  4.2   |  20  |  0.7    Ca    8.3[L]      23 Sep 2024 05:42  Phos  2.8     09-23  Mg     2.3     09-23    TPro  8.0  /  Alb  4.7  /  TBili  0.5  /  DBili  x   /  AST  44[H]  /  ALT  29  /  AlkPhos  76  09-21    PT/INR - ( 22 Sep 2024 17:27 )   PT: 13.50 sec;   INR: 1.18 ratio         PTT - ( 22 Sep 2024 17:27 )  PTT:31.4 sec          Imaging:  < from: CT Head No Cont (09.22.24 @ 06:17) >    FINDINGS:  Stable bilateral frontal convexity acute subarachnoid hemorrhage. Stable   acute subarachnoid hemorrhage in the right sylvian fissure.    Increased size of right inferior frontal parenchymal hemorrhagic   contusion, now measuring 3.2 x 2.2 x 1.8 cm. Stable left inferior frontal   parenchymal hemorrhagic contusion.    Stable foci of acute subarachnoid/intraparenchymal hemorrhage along the   bilateral anteromedial temporal lobes.    Stable small foci of acute subarachnoid hemorrhage layering in the right   parietal sulci.    There is no CT evidence of acute transcortical infarct.    There is no hydrocephalus. Basal cisterns are patent.    The visualized paranasal sinuses and mastoid air cells are clear.    Redemonstration of superior right parietal nondisplaced calvarial   fracture.    IMPRESSION:  Multi-compartmental acute intracranial hemorrhages as described above.    --- End of Report ---          EDGARDO MARES MD; Resident Radiologist  This document has been electronically signed.  SHEILA DIAZ MD; Attending Radiologist  This document has been electronically signed. Sep 22 2024 11:46AM    < end of copied text >      Assessment/Plan:   64M PMH DM, HLD s/p fall down stairs + ETOH found with B/L frontal contusions, SAH and calvarium fracture      Stable Neurological exam  Please repeat CTH today for access stability  Keppra BID x 7 days total  SBP <150  Cleared for PO diet  OK for PT/OT rehab and OOB  Care per SICU  Plan and care d/w Dr Ny

## 2024-09-23 NOTE — CONSULT NOTE ADULT - ASSESSMENT
IMPRESSION: Rehab of multitrauma / s/p fall with traumatic  bilateral SAH, IPH, and SDH, skull fx, + head trauma with no LOC /  DM and HLD     PRECAUTIONS: [   ] Cardiac  [   ] Respiratory  [   ] Seizures [   ] Contact Isolation  [   ] Droplet Isolation  [   ] Other    Weight Bearing Status:     RECOMMENDATION:    Out of Bed to Chair     DVT/Decubiti Prophylaxis    REHAB PLAN:     [x    ] Bedside P/T 3-5 times a week   [ x   ]   Bedside O/T  2-3 times a week             [    ] Speech Therapy               [    ]  No Rehab Therapy Indicated   Conditioning/ROM                                    ADL  Bed Mobility                                               Conditioning/ROM  Transfers                                                     Bed Mobility  Sitting /Standing Balance                         Transfers                                        Gait Training                                               Sitting/Standing Balance  Stair Training [   ]Applicable                    Home equipment Eval                                                                        Splinting  [   ] Only      GOALS:   ADL   [   x ]   Independent                    Transfers  [  x  ] Independent                          Ambulation  [   x ] Independent     [  x   ] With device                            [    ]  CG                                                         [    ]  CG                                                                  [    ] CG                            [    ] Min A                                                   [    ] Min A                                                              [    ] Min  A          DISCHARGE PLAN:   [    ]  Good candidate for Intensive Rehabilitation/Hospital based                                             Will tolerate 3hrs Intensive Rehab Daily                                       [     ]  Short Term Rehab in Skilled Nursing Facility                                       [     ]  Home with Outpatient or  services                                         [ x    ]  Possible Candidate for Intensive Hospital based Rehab

## 2024-09-24 ENCOUNTER — TRANSCRIPTION ENCOUNTER (OUTPATIENT)
Age: 64
End: 2024-09-24

## 2024-09-24 VITALS
HEART RATE: 79 BPM | OXYGEN SATURATION: 98 % | DIASTOLIC BLOOD PRESSURE: 77 MMHG | RESPIRATION RATE: 19 BRPM | SYSTOLIC BLOOD PRESSURE: 156 MMHG

## 2024-09-24 LAB
GLUCOSE BLDC GLUCOMTR-MCNC: 141 MG/DL — HIGH (ref 70–99)
GLUCOSE BLDC GLUCOMTR-MCNC: 171 MG/DL — HIGH (ref 70–99)

## 2024-09-24 PROCEDURE — 99291 CRITICAL CARE FIRST HOUR: CPT | Mod: GC

## 2024-09-24 RX ORDER — MAGNESIUM SULFATE 500 MG/ML
1 VIAL (ML) INJECTION ONCE
Refills: 0 | Status: COMPLETED | OUTPATIENT
Start: 2024-09-24 | End: 2024-09-24

## 2024-09-24 RX ORDER — LEVETIRACETAM 1000 MG
1 TABLET ORAL
Qty: 8 | Refills: 0
Start: 2024-09-24 | End: 2024-09-27

## 2024-09-24 RX ORDER — AMLODIPINE BESYLATE 5 MG
1 TABLET ORAL
Qty: 60 | Refills: 0
Start: 2024-09-24 | End: 2024-10-23

## 2024-09-24 RX ORDER — SODIUM PHOSPHATE IN D5W 15MMOL/250
30 PLASTIC BAG, INJECTION (ML) INTRAVENOUS ONCE
Refills: 0 | Status: COMPLETED | OUTPATIENT
Start: 2024-09-24 | End: 2024-09-24

## 2024-09-24 RX ORDER — AMLODIPINE BESYLATE 5 MG
1 TABLET ORAL
Qty: 30 | Refills: 0
Start: 2024-09-24 | End: 2024-10-23

## 2024-09-24 RX ADMIN — THIAMINE HYDROCHLORIDE 100 MILLIGRAM(S): 100 INJECTION, SOLUTION INTRAMUSCULAR; INTRAVENOUS at 11:52

## 2024-09-24 RX ADMIN — Medication 75 MILLILITER(S): at 05:23

## 2024-09-24 RX ADMIN — Medication 100 MILLIGRAM(S): at 11:44

## 2024-09-24 RX ADMIN — FOLIC ACID 1 MILLIGRAM(S): 1 TABLET ORAL at 11:52

## 2024-09-24 RX ADMIN — CHLORHEXIDINE GLUCONATE ORAL RINSE 1 APPLICATION(S): 1.2 SOLUTION DENTAL at 05:24

## 2024-09-24 RX ADMIN — Medication 750 MILLIGRAM(S): at 05:24

## 2024-09-24 RX ADMIN — Medication 100 GRAM(S): at 07:20

## 2024-09-24 RX ADMIN — ENOXAPARIN SODIUM 40 MILLIGRAM(S): 150 INJECTION SUBCUTANEOUS at 11:52

## 2024-09-24 RX ADMIN — Medication 5 MILLIGRAM(S): at 05:25

## 2024-09-24 RX ADMIN — Medication 500 MILLIGRAM(S): at 05:23

## 2024-09-24 RX ADMIN — Medication 85 MILLIMOLE(S): at 05:23

## 2024-09-24 RX ADMIN — Medication 2: at 08:02

## 2024-09-24 NOTE — DISCHARGE NOTE PROVIDER - NSFOLLOWUPCLINICS_GEN_ALL_ED_FT
[facility]  Gastroenterology  [address] [place] [address]  Phone: [phone]  Fax: [phone]  Follow Up Time: 2 months    [facility]  Trauma Surgery  [address], [place] [address]  Phone: [phone]  Fax:   Follow Up Time: 2 weeks

## 2024-09-24 NOTE — PHYSICAL THERAPY INITIAL EVALUATION ADULT - PERTINENT HX OF CURRENT PROBLEM, REHAB EVAL
64yM w/ PMHx of DM and HLD seen as a trauma consult s/p fall down 5 stairs in his home after drinking wine +HT, -LOC, -AC.  According to patient's wife, patient had about 2-3 glasses of wine and went down to the basement to watch TV when she heard a loud bang. She found the patient at the bottom of the stairs lying on his back. He was unable to get up on his own so EMS was called. Pt was examined at bedside, with no acute complaints at this time. Pt did not recall what happened but was awake, alert, and oriented (person, place, and time), Denied headache, dizziness, nausea, vomiting, blurry vision.

## 2024-09-24 NOTE — PROGRESS NOTE ADULT - ATTENDING COMMENTS
Critical Care: [id number]   This patient has a high probability of sudden, clinically significant deterioration, which requires the highest level of physician preparedness to intervene urgently. I managed/supervised life or organ supporting interventions that required frequent physician assessment. I devoted my full attention in the ICU to the direct care of this patient for the period of time indicated below. Time I spent with the family or surrogate(s) is included only if the patient was incapable of providing the necessary information or participating in medical decision making. Time devoted to teaching and to any procedures I billed separately is not included.     FRANCISCA COREY 64y Male admitted to [x ] SICU /[ ] SDU with traumatic brain injury SDH/SAH, intraparenchymal hemorrhage and right parietal skull fracture, Airway at risk for obstruction, at risk for neurological decline, at risk for hemodynamic instability, electrolytes abnormalities, intoxicate upon arrival to ED   Patient is examined and evaluated at the bedside with SICU team. Treatment plan discussed with SICU team, nurses and primary team.   Chest X-ray and all relevant studies reviewed during rounds.  Will continue hemodynamic monitoring as per protocol in SICU.    Neuro:  GCS [15 ] AAOx3  [ x]  Neurovascular checks as per SICU protocol, repeat NCHCT results pending, gal of Na 140-150                  [ ] 3% NaCl     [ ] 2% NaCl                [x ] Keppra  [ ] Lamictal  [ ] Depakote  [ ] Dilantin [ ] None                Pharmacological Paralysis [ ] Yes  [x ]  No      Sedated/Pain control with                 [ ] Dilaudid drip, [ ]  Ketamine drip, [ ] Fentanyl drip, [ ] Propofol, [ ] Precedex, [ ] Versed drip, [ ] Ativan drip,                           [ ] OxyContin standing,  [ ] OxyContin PRN, [ ] Dilaudid PRN pushes, [ ] Fentanyl PRN pushes, [ ] PCA,                [x ] Tylenol IV/PO, [ ] Gabapentin, [ ]  Ketorolac, [ ] Tramadol,  [ ] Lidoderm Patch       Other Medications               [ ] Seroquel, [ ] Zyprexa, [ ] Haldol,  [ ] Clonazepam [ ] Xanax, [ ] Versed/Ativan PRN, [ ] Valium [ ] Trazadone [x ] None               [x ] Robaxin   [ ] Baclofen  [ ] Flexeril               [x ] CIWA (Ativan/Valium/ Librium) score 1  CV: continue to monitor, occasional HTN -> Hydralazine given   On pressors [ ]  Yes  [ x]  No          [ ]  Levophed, [ ] Cuauhtemoc-Synephrine, [ ] Vasopressin, [ ]  Epinephrine          [ ] Dobutamine, [ ] Milrinone, [ ]  Midodrine,  [ ] Others    Other Cardiac Meds          [ ] Amiodarone IV/PO, [ ] Digoxin, [ ] Cardizem drip, [ ] Cleviprex drip, [ ] Esmolol drip, [ ] Cardene drip  Respiratory: Acute respiratory insufficiency -> continue monitoring, CXR -> bilateral clear lungs fields                         None Invasive Support  [x ] Incentive Spirometer 1500ml                                 [ ] BiPAP   [ ] CPAP/NIV   [ ] HFNC   [ ] NR Face Mask  [ ] NC  [ ] Trach Caller [x ] Room Air                        Ventilatory support  [ ] Yes [ x] No                            [ ] SBT                                  [ ] PC    [ ] VC   [ ] AC/PRVC   [ ] BiVent/APRV   [ ]CPAP   GI  [x ]  bowel regiment  [ x] BM 9/22/24 [x ] Flatus +  [ ] Ostomy   [ ] NG tube                 Prophylaxis [ ] PPI  [ ] H2 Blockers  [ ] Others [x] None  Nutrition: continue   [ x] Diet  Carb consistent  [ ] TPN/PPN   [ ] calories count   [ ]  Tube Feeds     Renal: Continue I&Os monitoring, Pinzon catheter  [ ] Yes  [x ]  No  [ ] Consideration for discontinuation [ ] Taxes cath/PrimaFit  [ ] TOV                                      [ ] HD/CVVH   [x ] Urine output 1800ml/24hrs       Lytes/Acid-base: replete hypokalemia, hypomagnesemia, hypocalcemia, hypophosphatemia        IV Fluids   [ ] LR  [x ] NS@KVO  [ ] D5W1/2NS [ ] Bicarbonate Drip  [ ] Albumin [ ] Lasix drip/push  [ ] Bumex drip/push  ID: leukocytosis -> continue to monitor: WBC 10.4->11.3        IV Abx [ x] Yes->Rocephin, [ ] No;  ID consulted [ ] Yes, [x ] No        Cultures send  [ ] Respiratory   [ ] Blood   [x ] Urine + [ ] Fluids  [ ] Tissue  [ x]  None  Lines:   [ ] Right   [ ] Left  [ ] Bilateral                     [ ] Subclavian TLC        [ ]  Internal Jugular TLC     [ ]  Femoral TLC                     [ ] Subclavian Cordis    [ ] Internal Jugular Cordis   [ ] Femoral Cordis                     [ ] HD catheter    [ ] PICC     [ ]  Midline   [ x] Peripheral IVs                                                 [ ] Right   [ ] Left   [ x] None                     [ ] Radial A-Line    [ ] Femoral A-Line   [ ] Axillary A-Line               Heme: continue to evaluate for acute blood loss anemia- trend Hg/Hct                     AC Reversal Indicated [ ] Yes  [ x] No                                      [ ] Kcentra,  [ ] 3Factors concentrate, [ ] Andexxa                     Transfused  indicated  [ ] Yes, [x ] No    [ ] PRBCs   [ ] Platelets   [ ] FFPs   [ ] Cryoprecipitate                    Should be started on or continued with  following  [ ] Yes,  [ x] No                               [ ] Lovenox  [ ] Coumadin  [ ] Heparin drip  [ ] DOVACs  [ ] ASA  [ ] Antiplatelets   Endocrine: Prevent and treat hyperglycemia with insulin as needed,                                [ x] Sliding scale,  [ ] Lantus/Regular Insuline                              Insuline drip for hyperglycemia [ ] Yes, [x ] No   PV: follow pulse exam  Skin: decub precautions  DVT Prophylaxis:  [ x] SCDs  [ ] Heparin SQ  [x ] Lovenox  SQ  [ ] ASA  Stress Gastritis Prophylaxis: PPI/H2 Blockers if indicated  Mobility: patient is evaluated at the bedside with mobility team and the goals for today are discussed with PT [ x] out of bed to chair and ambulate    PATIENT/FAMILY/SURROGATE CONFERENCE:  [x ] Yes with patient at the bedside. [ ] No  PURPOSE: To obtain necessary information, To discuss treatment options under consideration today.    I saw and evaluated the patient personally. I have reviewed and agree with note above. Treatment plan discussed with SICU team, nurses and primary team at the time of the multidisciplinary rounds. The above note is NOT written at the time of rounds and will reflect all changes throughout management of the patient for the day note is written for.    Janene Schafer MD, FACS  Trauma/ACS/SCC Attending
patient is neuro intact.  GCS 15.  CT Head #3 today shows slightly increased Right Frontal Intracerebral Hematoma.  Using IS adequately.    ASSESSMENT:  63 y/o male, S/p Fall down stairs.  Traumatic  Bilateral SAHs.  Traumatic  Bilateral Intracerebral Hematomas.  Traumatic Bilateral SDHs.  Right parietal Bone Fracture.  At risk for neuro deficit.  Acute lactic acidosis.  Acute alcohol intoxication.    PLAN:  - neuro checks q1h   - on Keppra 750 mg bid for seizure prophylaxis  - head elevation at 30 degree  - on 3% saline at 50 ml/h - follow Na  - NCC f/u  - keep normotensive  - ok for clear liquid diet  - aspiration precautions at all time  - follow serum electrolytes and UOP  - ID - on Zosyn   - DVT prophylaxis with SCDs until stable CT
Critical Care: [id number]   This patient has a high probability of sudden, clinically significant deterioration, which requires the highest level of physician preparedness to intervene urgently. I managed/supervised life or organ supporting interventions that required frequent physician assessment. I devoted my full attention in the ICU to the direct care of this patient for the period of time indicated below. Time I spent with the family or surrogate(s) is included only if the patient was incapable of providing the necessary information or participating in medical decision making. Time devoted to teaching and to any procedures I billed separately is not included.     FRANCISCA COREY 64y Male admitted to [x ] SICU /[ ] SDU with traumatic brain injury SDH/SAH, intraparenchymal hemorrhage and right parietal skull fracture, Airway at risk for obstruction, at risk for neurological decline, at risk for hemodynamic instability, electrolytes abnormalities, intoxicate upon arrival to ED   Patient is examined and evaluated at the bedside with SICU team. Treatment plan discussed with SICU team, nurses and primary team.   Chest X-ray and all relevant studies reviewed during rounds.  Will continue hemodynamic monitoring as per protocol in SICU.    Neuro:  GCS [15 ] AAOx3  [ x]  Neurovascular checks as per SICU protocol, repeat NCHCT results pending, gal of Na 140-150                  [ ] 3% NaCl     [ ] 2% NaCl                [x ] Keppra  [ ] Lamictal  [ ] Depakote  [ ] Dilantin [ ] None                Pharmacological Paralysis [ ] Yes  [x ]  No      Sedated/Pain control with                 [ ] Dilaudid drip, [ ]  Ketamine drip, [ ] Fentanyl drip, [ ] Propofol, [ ] Precedex, [ ] Versed drip, [ ] Ativan drip,                           [ ] OxyContin standing,  [ ] OxyContin PRN, [ ] Dilaudid PRN pushes, [ ] Fentanyl PRN pushes, [ ] PCA,                [x ] Tylenol IV/PO, [ ] Gabapentin, [ ]  Ketorolac, [ ] Tramadol,  [ ] Lidoderm Patch       Other Medications               [ ] Seroquel, [ ] Zyprexa, [ ] Haldol,  [ ] Clonazepam [ ] Xanax, [ ] Versed/Ativan PRN, [ ] Valium [ ] Trazadone [x ] None               [x ] Robaxin   [ ] Baclofen  [ ] Flexeril               [x ] CIWA (Ativan/Valium/ Librium) score 1  CV: continue to monitor, occasional HTN -> Hydralazine given   On pressors [ ]  Yes  [ x]  No          [ ]  Levophed, [ ] Cuauhtemoc-Synephrine, [ ] Vasopressin, [ ]  Epinephrine          [ ] Dobutamine, [ ] Milrinone, [ ]  Midodrine,  [ ] Others    Other Cardiac Meds          [ ] Amiodarone IV/PO, [ ] Digoxin, [ ] Cardizem drip, [ ] Cleviprex drip, [ ] Esmolol drip, [ ] Cardene drip  VBG - ( 21 Sep 2024 21:07 )  pH: 7.32  /  pCO2: 44    /  pO2: 21    / HCO3: 23    / Base Excess: -3.5  /  SaO2: 26.6   Lactate: 2.5    Respiratory: Acute respiratory insufficiency -> continue monitoring, CXR -> bilateral clear lungs fields                         None Invasive Support  [x ] Incentive Spirometer 1500ml                                 [ ] BiPAP   [ ] CPAP/NIV   [ ] HFNC   [ ] NR Face Mask  [ ] NC  [ ] Trach Caller [x ] Room Air                        Ventilatory support  [ ] Yes [ x] No                            [ ] SBT                                  [ ] PC    [ ] VC   [ ] AC/PRVC   [ ] BiVent/APRV   [ ]CPAP   GI  [x ]  bowel regiment  [ x] BM 9/22/24 [x ] Flatus +  [ ] Ostomy   [ ] NG tube                 Prophylaxis [ ] PPI  [ ] H2 Blockers  [ ] Others [x] None  Nutrition: continue   [ x] Diet  Carb consistent  [ ] TPN/PPN   [ ] calories count   [ ]  Tube Feeds     Renal: Continue I&Os monitoring, Pinzon catheter  [ ] Yes  [x ]  No  [ ] Consideration for discontinuation [ ] Taxes cath/PrimaFit  [ ] TOV                                                               [ ] HD/CVVH   [x ] Urine output 1300ml/24hrs       Lytes/Acid-base: replete hypokalemia, hypomagnesemia, hypocalcemia, hypophosphatemia        IV Fluids   [ ] LR  [x ] NS@75ml/hr  [ ] D5W1/2NS [ ] Bicarbonate Drip  [ ] Albumin [ ] Lasix drip/push  [ ] Bumex drip/push  ID: leukocytosis -> continue to monitor: WBC 10.4        IV Abx [ x] Yes->Rocephin, [ ] No;  ID consulted [ ] Yes, [x ] No        Cultures send  [ ] Respiratory   [ ] Blood   [x ] Urine + [ ] Fluids  [ ] Tissue  [ x]  None  Lines:   [ ] Right   [ ] Left  [ ] Bilateral                     [ ] Subclavian TLC        [ ]  Internal Jugular TLC     [ ]  Femoral TLC                     [ ] Subclavian Cordis    [ ] Internal Jugular Cordis   [ ] Femoral Cordis                     [ ] HD catheter    [ ] PICC     [ ]  Midline   [ x] Peripheral IVs                                                 [ ] Right   [ ] Left   [ x] None                     [ ] Radial A-Line    [ ] Femoral A-Line   [ ] Axillary A-Line               Heme: continue to evaluate for acute blood loss anemia- trend Hg/Hct                     AC Reversal Indicated [ ] Yes  [ x] No                                      [ ] Kcentra,  [ ] 3Factors concentrate, [ ] Andexxa                     Transfused  indicated  [ ] Yes, [x ] No    [ ] PRBCs   [ ] Platelets   [ ] FFPs   [ ] Cryoprecipitate                    Should be started on or continued with  following  [ ] Yes,  [ x] No                               [ ] Lovenox  [ ] Coumadin  [ ] Heparin drip  [ ] DOVACs  [ ] ASA  [ ] Antiplatelets   Endocrine: Prevent and treat hyperglycemia with insulin as needed,                                [ x] Sliding scale,  [ ] Lantus/Regular Insuline                              Insuline drip for hyperglycemia [ ] Yes, [x ] No   PV: follow pulse exam  Skin: decub precautions  DVT Prophylaxis:  [ x] SCDs  [ ] Heparin SQ  [x ] Lovenox  SQ  [ ] ASA  Stress Gastritis Prophylaxis: PPI/H2 Blockers if indicated  Mobility: patient is evaluated at the bedside with mobility team and the goals for today are discussed with PT [ x] out of bed to chair and ambulate    PATIENT/FAMILY/SURROGATE CONFERENCE:  [x ] Yes with patient at the bedside. [ ] No  PURPOSE: To obtain necessary information, To discuss treatment options under consideration today.    I saw and evaluated the patient personally. I have reviewed and agree with note above. Treatment plan discussed with SICU team, nurses and primary team at the time of the multidisciplinary rounds. The above note is NOT written at the time of rounds and will reflect all changes throughout management of the patient for the day note is written for.    Janene Schafer MD, FACS  Trauma/ACS/SCC Attending

## 2024-09-24 NOTE — DISCHARGE NOTE PROVIDER - NSDCCPCAREPLAN_GEN_ALL_CORE_FT
PRINCIPAL DISCHARGE DIAGNOSIS  Diagnosis: Traumatic intracranial hemorrhage  Assessment and Plan of Treatment: SURGERY DISCHARGE INSTRUCTIONS  FOLLOW-UP - with Neurosurgery in 2 weeks (call to make appointment). With trauma clinic in 2 weeks (call to make appoointment). With gastroenterology in 2 months to schedule a colonoscopy. Call the office to make an appointment or if you have any questions/concerns.  DIET - DASH diet   ACTIVITY- No heavy lifting for 4-6 wks over 10-20 lbs. Walking is encouraged. No running or swimming. No driving while taking pain medication.  PAIN MEDS - Take over the counter extra strength tylenol 650mg and/or ibuprofen 400mg with food every 6 hours for pain instead of prescription pain meds if you do not need stronger pain control. Do not take tylenol in addition to your prescription pain med if your prescription pain med already has tylenol in it. No more than 4g of tylenol in 24hrs or 1g in 4 hrs. No mixing alcohol with prescription pain meds. No driving or operating machinery while taking prescription pain meds.  OTHER MEDS - Call your primary care physician to discuss new onset hypertension.  If you develop fever, dizziness, chest pain, trouble breathing, nausea, vomiting, increasing abdominal pain, inability to pass bowel movements, redness/pain/discharge from incisions. Please call the office or go to the emergency room immediately.        SECONDARY DISCHARGE DIAGNOSES  Diagnosis: Calvarial fracture  Assessment and Plan of Treatment:     Diagnosis: Diverticulitis  Assessment and Plan of Treatment:

## 2024-09-24 NOTE — DISCHARGE NOTE PROVIDER - NSFOLLOWUPCLINICSTOKEN_GEN_ALL_ED_FT
Thank you for choosing the [facility] [place], Nory Sher NP, and our team as your Neurology Specialists.  We actively use feedback to constantly improve and deliver the best care possible. To provide the best experience, we are collecting feedback from you on how we performed.  You may receive a survey in the mail to evaluate how we did. Please take a moment and share your thoughts.      If for any reason you feel that we did not meet your expectations or you want to share a positive experience, please give us a call. Your feedback helps us know how we are doing and what we can be doing better.    Office hours: 8:00 am to 4:30 pm, Monday - Friday  Phone: [phone]   
[id number]:2 months|| ||00\01||False;[id number]:2 weeks|| ||00\01||False;

## 2024-09-24 NOTE — DISCHARGE NOTE NURSING/CASE MANAGEMENT/SOCIAL WORK - PATIENT PORTAL LINK FT
You can access the FollowMyHealth Patient Portal offered by [facility] by registering at the following website: [email or web address]. By joining [facility]’s FollowMyHealth portal, you will also be able to view your health information using other applications (apps) compatible with our system.

## 2024-09-24 NOTE — DISCHARGE NOTE PROVIDER - HOSPITAL COURSE
64yM w/ PMHx of DM and HLD seen as a trauma consult s/p fall down 5 stairs in his home after drinking wine +HT, -LOC, -AC.  According to patient's wife, patient had about 2-3 glasses of wine and went down to the basement to watch TV when she heard a loud bang. She found the patient at the bottom of the stairs lying on his back. He was unable to get up on his own so EMS was called. He does not complain of any pain at this time. Trauma assessment in ED: ABCs intact , GCS 15 , AAOx3.   On admission, patient had CTH and Cspine which showed skull fracture ans SAH. Trauma activated.     Injuries identified on further workup included: right frontal SAH, right parietal skull fx.     Over the hospital curse the patient underwent 4 CT heads. The last one of which mentioned: No significant change in appearance of acute intracranial hemorrhages   without significant mass effect or midline shift. Followed by neurosurgery who recommended that in the setting of stable neurological exam and CTH , no further workup was necessary, neurological checks every 4 hours, keppra BID for 7 days. Evaluated by physical therapy who determined no skilled PT needs.     At the moment patient on room air, alert and cooperative. Blood pressure with periodic elevation to the 160s, recommending followup with his caridoologist/primary care. Declines headaches, has been OOB with no assistance.     IMAGING AS BELOW:   < from: CT Head No Cont (09.23.24 @ 13:06) >  IMPRESSION: No significant change in appearance of acute intracranial hemorrhages   without significant mass effect or midline shift.  --- End of Report ---  < end of copied text >    < from: CT Head No Cont (09.22.24 @ 06:17) >  IMPRESSION: Multi-compartmental acute intracranial hemorrhages as described above.  --- End of Report ---  < end of copied text >    < from: CT Head No Cont (09.21.24 @ 23:46) >  IMPRESSION:  In comparison to prior CT head examination performed on 9/21/2024 at 7:45   PM,Increased subarachnoid hemorrhage along the right anterior frontal   convexity.New subarachnoid hemorrhage along the left anterior frontal convexity.  New intraparenchymal hemorrhage within the bilateral anterior frontal   lobes.New thin subdural hemorrhage along the left and probable right parietal   convexities.Similar appearing subarachnoid hemorrhage in the right insular region.  Unchanged nondepressed high right parietal calvarial fracture.  --- End of Report ---  < end of copied text >    < from: CT Cervical Spine No Cont (09.21.24 @ 19:57) >  Impression:  CT head: Acute, nondepressed calvarial fracture of high right parietal bone.  Acute subarachnoid hemorrhage along right inferior frontal sulci.  CT cervical spine: No evidence of acute fracture of the cervical spine.  --- End of Report ---  < end of copied text >    < from: CT Head No Cont (09.21.24 @ 19:46) >  Impression: CT head: Acute, nondepressed calvarial fracture of high right parietal bone.  Acute subarachnoid hemorrhage along right inferior frontal sulci.  CT cervical spine: No evidence of acute fracture of the cervical spine.  --- End of Report ---  < end of copied text >

## 2024-09-24 NOTE — PROGRESS NOTE ADULT - SUBJECTIVE AND OBJECTIVE BOX
GENERAL SURGERY PROGRESS NOTE    Patient: FRANCISCA COREY , 64y ([birth year])Male   MRN: [id number]  Location: [place] [address] A  Visit: 09-22-24 Inpatient  Date: 09-24-24 @ 10:40    Hospital Day #: 4    Procedure/Dx/Injuries:  - parietal skull fx  - frontal SAH    Events of past 24 hours:  -NSGY --> CTH #4 stable, ok for Q4 Neuro checks, continue keppra x 7 days  -CLD --> advanced to regular  - PT evaluated patient this AM, says ok to go home, note and final eval pending  - ok'd for DVT ppx by NSHANK    PAST MEDICAL & SURGICAL HISTORY:  Diabetes          Vitals:   T(F): 98.9 (09-24-24 @ 07:38), Max: 100.4 (09-23-24 @ 18:00)  HR: 73 (09-24-24 @ 08:00)  BP: 160/81 (09-24-24 @ 08:00)  RR: 18 (09-24-24 @ 08:00)  SpO2: 98% (09-24-24 @ 08:00)      Diet, Consistent Carbohydrate Clear Liquid      Fluids:     I & O's:    09-23-24 @ 07:01  -  09-24-24 @ 07:00  --------------------------------------------------------  IN:    IV PiggyBack: 50 mL    IV PiggyBack: 100 mL    IV PiggyBack: 500 mL    Oral Fluid: 660 mL    sodium chloride 0.9%: 1725 mL  Total IN: 3035 mL    OUT:    Voided (mL): 1500 mL  Total OUT: 1500 mL    Total NET: 1535 mL        Bowel Movement: : [x] YES [] NO  Flatus: : [x] YES [] NO    PHYSICAL EXAM:  General: comfortable, no acute distress  CV: RRR on monitor  Pulm: breathing well on RA, no acute respiratory distress  Abd: soft, nontender, nondistended  Neuro: no cranial nerve deficits, pupils equal and reactive, no peripheral weakness/sensory loss, no dysarthria    MEDICATIONS  (STANDING):  amLODIPine   Tablet 5 milliGRAM(s) Oral daily  cefTRIAXone   IVPB 1000 milliGRAM(s) IV Intermittent every 24 hours  chlorhexidine 2% Cloths 1 Application(s) Topical <User Schedule>  enoxaparin Injectable 40 milliGRAM(s) SubCutaneous every 24 hours  folic acid 1 milliGRAM(s) Oral daily  influenza   Vaccine 0.5 milliLiter(s) IntraMuscular once  insulin lispro (ADMELOG) corrective regimen sliding scale   SubCutaneous Before meals and at bedtime  levETIRAcetam 750 milliGRAM(s) Oral every 12 hours  methocarbamol 500 milliGRAM(s) Oral every 8 hours  sodium chloride 0.9%. 1000 milliLiter(s) (75 mL/Hr) IV Continuous <Continuous>  thiamine 100 milliGRAM(s) Oral daily    MEDICATIONS  (PRN):  acetaminophen     Tablet .. 650 milliGRAM(s) Oral every 6 hours PRN Mild Pain (1 - 3)  ondansetron Injectable 4 milliGRAM(s) IV Push every 6 hours PRN Nausea      DVT PROPHYLAXIS: enoxaparin Injectable 40 milliGRAM(s) SubCutaneous every 24 hours    GI PROPHYLAXIS:   ANTICOAGULATION:   ANTIBIOTICS:  cefTRIAXone   IVPB 1000 milliGRAM(s)            LAB/STUDIES:  Labs:  CAPILLARY BLOOD GLUCOSE      POCT Blood Glucose.: 171 mg/dL (24 Sep 2024 07:43)  POCT Blood Glucose.: 142 mg/dL (23 Sep 2024 21:41)  POCT Blood Glucose.: 135 mg/dL (23 Sep 2024 16:04)  POCT Blood Glucose.: 154 mg/dL (23 Sep 2024 11:53)                          11.8   11.36 )-----------( 213      ( 23 Sep 2024 20:00 )             35.1         09-23    132[L]  |  100  |  9[L]  ----------------------------<  129[H]  4.0   |  18  |  0.6[L]      Calcium: 8.4 mg/dL (09-23-24 @ 20:00)      LFTs:     Lactate, Blood: 1.3 mmol/L (09-22-24 @ 04:15)  Blood Gas Venous - Lactate: 2.5 mmol/L (09-21-24 @ 21:07)  Blood Gas Venous - Lactate: 3.0 mmol/L (09-21-24 @ 20:07)  Lactate, Blood: 2.9 mmol/L (09-21-24 @ 19:00)      Coags:     13.50  ----< 1.18    ( 22 Sep 2024 17:27 )     31.4                Urinalysis Basic - ( 23 Sep 2024 20:00 )    Color: x / Appearance: x / SG: x / pH: x  Gluc: 129 mg/dL / Ketone: x  / Bili: x / Urobili: x   Blood: x / Protein: x / Nitrite: x   Leuk Esterase: x / RBC: x / WBC x   Sq Epi: x / Non Sq Epi: x / Bacteria: x                IMAGING:  < from: CT Head No Cont (09.23.24 @ 13:06) >  FINDINGS:    Redemonstration of acute intraparenchymal hemorrhages involving the   anterior inferior frontal lobes consistent with traumatic contusions.   There are thin subdural collections overlying the cerebral hemispheres.   There is no mass effect or midline shift.    Ventricles and sulci are otherwise unremarkable. There is no   hydrocephalus.    Redemonstration of a nondepressed right parietal calvarial fracture. The   visualized intraorbital compartments, paranasal sinuses and mastoid   complexes appear free of acute disease.    IMPRESSION:  No significant change in appearance of acute intracranial hemorrhages   without significant mass effect or midline shift.    --- End of Report ---            < end of copied text >

## 2024-09-24 NOTE — PHYSICAL THERAPY INITIAL EVALUATION ADULT - MD/RN NOTIFIED
Care Due:                  Date            Visit Type   Department     Provider  --------------------------------------------------------------------------------                                MYCHART                              FOLLOWUP/OF  [facility]  Last Visit: 03-      FICE VISIT   MEDICINE       Guanakito Harrington                              EP -                              PRIMARY      [facility]  Next Visit: 10-      CARE ([facility])   MEDICINE       Guanakito Harrington                                                            Last  Test          Frequency    Reason                     Performed    Due Date  --------------------------------------------------------------------------------    HBA1C.......  6 months...  glimepiride, metFORMIN,    02- 08-                             nateglinide, pioglitazone    [employer] Embedded Care Due Messages. Reference number: [id number].   5/31/2023 8:09:47 AM CDT   yes

## 2024-09-24 NOTE — PHYSICAL THERAPY INITIAL EVALUATION ADULT - GENERAL OBSERVATIONS, REHAB EVAL
9:10-9:35. chart reviewed. Pt received semi-woody at B/S, alert, oriented, able to follow multi-step instructions and agreeable to PT evaluation. + IV, + monitoring, , cleared for PT by MD, sensation /motor/coordination intact, NAD, Pt demonstrates baseline mobility, ambulate without AD, negotiate 11 steps reciprocal pattern, Pt is not candidate for PT at this time further PT referral PRN.

## 2024-09-24 NOTE — PHYSICAL THERAPY INITIAL EVALUATION ADULT - DIAGNOSIS, PT EVAL
Rehab of multitrauma / s/p fall with traumatic  bilateral SAH, IPH, and SDH, skull fx, + head trauma with no LOC

## 2024-09-24 NOTE — PROGRESS NOTE ADULT - ASSESSMENT
ASSESSMENT:  64y M w/ PMHx of DM and HLD presenting on 9/22 as a trauma consult s/p fall down 5 stairs at home, +HT, -LOC, -AC found to have frontal SAH/IPH. Patient now s/p 4th CTH, SAH and IPH appear stable in size, NSGY ok'd for Q4 neuro checks. Patient appears comfortable without deficits    PLAN:  - pending PT final eval today, likely ok for d/c home from ICU  - per NSGY, continue KEppra x 7 days  - cont SQH for DVT ppx  - on Rocephin for UTI now day 3/3, no fevers or leukocytosis --> stop today, no need for PO abx  - regular diet  - DISPO: d/c pending PT final recs  - f/u SICU recs    Patient seen and examined and plan of care discussed with my attending, Dr. Miky Nelson MD  PGY2 Trauma Surgery

## 2024-09-24 NOTE — PHYSICAL THERAPY INITIAL EVALUATION ADULT - ADDITIONAL COMMENTS
Pt resides with wife in a private house using 12 steps to enter, and 12 to access bedroom/shower. Pt used to be independent with overall functional mobility, able to ambulate using No AD at baseline

## 2024-09-24 NOTE — DISCHARGE NOTE NURSING/CASE MANAGEMENT/SOCIAL WORK - NSDCVIVACCINE_GEN_ALL_CORE_FT
Tdap; 21-Sep-2024 19:20; Chaitanya Drew (RN); [employer]; [id number] (Exp. Date: 21-Aug-2026); IntraMuscular; Deltoid Right.; 0.5 milliLiter(s); VIS (VIS Published: 09-May-2013, VIS Presented: 21-Sep-2024);

## 2024-09-24 NOTE — PROGRESS NOTE ADULT - REASON FOR ADMISSION
SAH, parietal skull fx s/p fall down stairs

## 2024-09-24 NOTE — DISCHARGE NOTE NURSING/CASE MANAGEMENT/SOCIAL WORK - NSTRANSFERBELONGINGSDISPO_GEN_A_NUR
Left parent a message on her personalized VM stating below. Mom  to call at their earliest convenience to schedule appointment.    given to family

## 2024-09-24 NOTE — PROGRESS NOTE ADULT - SUBJECTIVE AND OBJECTIVE BOX
FRANCISCA COREY   [id number]   [birth year]  64yM    ============================   SICU Consult for Q1 neurovascular checks in setting of acute intracranial bleed     24 Hour Events  -Admission under SICU service    SICU events:   9/21: Admitted to SICU for calvarial fracture of high right parietal bone and right SAH. CTH #2: increased SAH along the right anterior frontal convexity, new SAH along the left anterior convexity, new intraparenchymal hemorrhage, new thin SDH along left and probable right parietal convexities, similar SAH in right parietal calvarial fracture. Sodium goal 140-150 per NSGY started on hypertonic saline @50 + NS @ 100, TXA 1gx1 & DDAVP 32mcg x1, thiamine, folic acid and CIWA ordered.   9/22: CTH #3 stable, CLD, hydralazine ordered x 2 for SBP > 140 (goal per NSICU and NSGY 110-140), rocephin for UTI. DVT ppx started per NSGY  9/23:Pm Rounds: HR 70, /78, sat 96% on RA, A&Ox3, denies headache/blurry vision, 30 NaPhos, 1 g Mg, PT consult, Repeat CTH - No significant change in appearance of acute intracranial hemorrhages without significant mass effect or midline shift, Neurosurgery recs - no further Neurosurgical workup, q4h neuro checks. Keppra BID x 7 days total. Cleared for DVT ppx SQH. Neurosurgery Sign off, reconsult PRN      [X] A ten-point review of systems was otherwise negative except as noted above.  [  ] Due to altered mental status/intubation, subjective information was not attained from the patient. History was obtained, to the extent possible, from review of the chart and collateral sources of information.  ====================================================================  64yM w/ PMHx of DM and HLD seen as a trauma consult s/p fall down 5 stairs in his home after drinking wine +HT, -LOC, -AC.  According to patient's wife, patient had about 2-3 glasses of wine and went down to the basement to watch TV when she heard a loud bang. She found the patient at the bottom of the stairs lying on his back. He was unable to get up on his own so EMS was called. Pt does not recall what happened. He does not complain of any pain at this time. Pt was evaluated by neurosurgery with recommendations for repeat head CT, Q1 neuro checks, keppra, SBP goal <140.    SICU consulted for Q1 neuro checks in setting of acute intracranial bleed.        FRANCISCA COREY   [id number]   [birth year]  64yM    ============================   SICU Consult for Q1 neurovascular checks in setting of acute intracranial bleed     24 Hour Events  -Admission under SICU service    SICU events:   9/21: Admitted to SICU for calvarial fracture of high right parietal bone and right SAH. CTH #2: increased SAH along the right anterior frontal convexity, new SAH along the left anterior convexity, new intraparenchymal hemorrhage, new thin SDH along left and probable right parietal convexities, similar SAH in right parietal calvarial fracture. Sodium goal 140-150 per NSGY started on hypertonic saline @50 + NS @ 100, TXA 1gx1 & DDAVP 32mcg x1, thiamine, folic acid and CIWA ordered.   9/22: CTH #3 stable, CLD, hydralazine ordered x 2 for SBP > 140 (goal per NSICU and NSGY 110-140), Rocephin for UTI. DVT ppx started per NSGY  9/23:Pm Rounds: HR 70, /78, sat 96% on RA, A&Ox3, denies headache/blurry vision, 30 NaPhos, 1 g Mg, PT consult, Repeat CTH - No significant change in appearance of acute intracranial hemorrhages without significant mass effect or midline shift, Neurosurgery recommendations  - no further Neurosurgical workup, q4h neuro checks. Keppra BID x 7 days total. Cleared for DVT ppx SQH. Neurosurgery Sign off, reconsult PRN      [X] A ten-point review of systems was otherwise negative except as noted above.  [  ] Due to altered mental status/intubation, subjective information was not attained from the patient. History was obtained, to the extent possible, from review of the chart and collateral sources of information.  ====================================================================  64yM w/ PMHx of DM and HLD seen as a trauma consult s/p fall down 5 stairs in his home after drinking wine +HT, -LOC, -AC.  According to patient's wife, patient had about 2-3 glasses of wine and went down to the basement to watch TV when she heard a loud bang. She found the patient at the bottom of the stairs lying on his back. He was unable to get up on his own so EMS was called. Pt does not recall what happened. He does not complain of any pain at this time. Pt was evaluated by neurosurgery with recommendations for repeat head CT, Q1 neuro checks, Keppra SBP goal <140.    SICU consulted for Q1 neuro checks in setting of acute intracranial bleed.        Daily     Daily     Diet, Consistent Carbohydrate Clear Liquid (09-22-24 @ 10:51)      CURRENT MEDS:  Neurologic Medications  acetaminophen     Tablet .. 650 milliGRAM(s) Oral every 6 hours PRN Mild Pain (1 - 3)  levETIRAcetam 750 milliGRAM(s) Oral every 12 hours  methocarbamol 500 milliGRAM(s) Oral every 8 hours  ondansetron Injectable 4 milliGRAM(s) IV Push every 6 hours PRN Nausea    Respiratory Medications    Cardiovascular Medications  amLODIPine   Tablet 5 milliGRAM(s) Oral daily    Gastrointestinal Medications  folic acid 1 milliGRAM(s) Oral daily  sodium chloride 0.9%. 1000 milliLiter(s) IV Continuous <Continuous>  thiamine 100 milliGRAM(s) Oral daily    Genitourinary Medications    Hematologic/Oncologic Medications  enoxaparin Injectable 40 milliGRAM(s) SubCutaneous every 24 hours  influenza   Vaccine 0.5 milliLiter(s) IntraMuscular once    Antimicrobial/Immunologic Medications    Endocrine/Metabolic Medications  insulin lispro (ADMELOG) corrective regimen sliding scale   SubCutaneous Before meals and at bedtime    Topical/Other Medications  chlorhexidine 2% Cloths 1 Application(s) Topical <User Schedule>      ICU Vital Signs Last 24 Hrs  T(C): 37.2 (24 Sep 2024 07:38), Max: 38 (23 Sep 2024 18:00)  T(F): 98.9 (24 Sep 2024 07:38), Max: 100.4 (23 Sep 2024 18:00)  HR: 73 (24 Sep 2024 08:00) (68 - 75)  BP: 160/81 (24 Sep 2024 08:00) (137/66 - 167/93)  BP(mean): 114 (24 Sep 2024 08:00) (95 - 123)  ABP: --  ABP(mean): --  RR: 18 (24 Sep 2024 08:00) (11 - 21)  SpO2: 98% (24 Sep 2024 08:00) (96% - 99%)    O2 Parameters below as of 24 Sep 2024 08:00  Patient On (Oxygen Delivery Method): room air                  I&O's Summary    23 Sep 2024 07:01  -  24 Sep 2024 07:00  --------------------------------------------------------  IN: 3035 mL / OUT: 1500 mL / NET: 1535 mL    24 Sep 2024 07:01  -  24 Sep 2024 12:04  --------------------------------------------------------  IN: 225 mL / OUT: 350 mL / NET: -125 mL      I&O's Detail    23 Sep 2024 07:01  -  24 Sep 2024 07:00  --------------------------------------------------------  IN:    IV PiggyBack: 50 mL    IV PiggyBack: 100 mL    IV PiggyBack: 500 mL    Oral Fluid: 660 mL    sodium chloride 0.9%: 1725 mL  Total IN: 3035 mL    OUT:    Voided (mL): 1500 mL  Total OUT: 1500 mL    Total NET: 1535 mL      24 Sep 2024 07:01  -  24 Sep 2024 12:04  --------------------------------------------------------  IN:    sodium chloride 0.9%: 225 mL  Total IN: 225 mL    OUT:    Voided (mL): 350 mL  Total OUT: 350 mL    Total NET: -125 mL              PHYSICAL EXAM:    General: Pt lying comfortably in bed.     Neuro: Alert & oriented x 3, no focal deficits. CNs II-XII grossly intact. PERRLA, EOMs intact. Spontaneously moving all extremities     Lungs: Normal work of breathing    Cardiovascular :  Regular rate and rhythm. Cardiac Rhythm: NSR    GI: Abdomen soft, Non-tender, Non-distended.    Extremities: Extremities warm, pink, well-perfused.     Derm: Good skin turgor, no skin breakdown.     : voiding freely.       LABS:  CAPILLARY BLOOD GLUCOSE      POCT Blood Glucose.: 141 mg/dL (24 Sep 2024 11:55)  POCT Blood Glucose.: 171 mg/dL (24 Sep 2024 07:43)  POCT Blood Glucose.: 142 mg/dL (23 Sep 2024 21:41)  POCT Blood Glucose.: 135 mg/dL (23 Sep 2024 16:04)                          11.8   11.36 )-----------( 213      ( 23 Sep 2024 20:00 )             35.1       09-23    132[L]  |  100  |  9[L]  ----------------------------<  129[H]  4.0   |  18  |  0.6[L]    Ca    8.4      23 Sep 2024 20:00  Phos  2.3     09-23  Mg     1.9     09-23        PT/INR - ( 22 Sep 2024 17:27 )   PT: 13.50 sec;   INR: 1.18 ratio         PTT - ( 22 Sep 2024 17:27 )  PTT:31.4 sec      Urinalysis Basic - ( 23 Sep 2024 20:00 )    Color: x / Appearance: x / SG: x / pH: x  Gluc: 129 mg/dL / Ketone: x  / Bili: x / Urobili: x   Blood: x / Protein: x / Nitrite: x   Leuk Esterase: x / RBC: x / WBC x   Sq Epi: x / Non Sq Epi: x / Bacteria: x        FRANCISCA COREY   [id number]   [birth year]  64yM    ============================   SICU Consult for Q1 neurovascular checks in setting of acute intracranial bleed     24 Hour Events  -Admission under SICU service    SICU events:   9/21: Admitted to SICU for calvarial fracture of high right parietal bone and right SAH. CTH #2: increased SAH along the right anterior frontal convexity, new SAH along the left anterior convexity, new intraparenchymal hemorrhage, new thin SDH along left and probable right parietal convexities, similar SAH in right parietal calvarial fracture. Sodium goal 140-150 per NSGY started on hypertonic saline @50 + NS @ 100, TXA 1gx1 & DDAVP 32mcg x1, thiamine, folic acid and CIWA ordered.   9/22: CTH #3 stable, CLD, hydralazine ordered x 2 for SBP > 140 (goal per NSICU and NSGY 110-140), Rocephin for UTI. DVT ppx started per NSGY  9/23:Pm Rounds: HR 70, /78, sat 96% on RA, A&Ox3, denies headache/blurry vision, 30 NaPhos, 1 g Mg, PT consult, Repeat CTH - No significant change in appearance of acute intracranial hemorrhages without significant mass effect or midline shift, Neurosurgery recommendations  - no further Neurosurgical workup, q4h neuro checks. Keppra BID x 7 days total. Cleared for DVT ppx SQH. Neurosurgery Sign off, reconsult PRN      [X] A ten-point review of systems was otherwise negative except as noted above.  [  ] Due to altered mental status/intubation, subjective information was not attained from the patient. History was obtained, to the extent possible, from review of the chart and collateral sources of information.  ====================================================================

## 2024-09-24 NOTE — DISCHARGE NOTE PROVIDER - NSDCMRMEDTOKEN_GEN_ALL_CORE_FT
amLODIPine 5 mg oral tablet: 1 tab(s) orally 2 times a day  gemfibrozil 600 mg oral tablet: 1 tab(s) orally once a day  GlyBURIDE (Eqv-DiaBeta) 5 mg oral tablet: 1 tab(s) orally once a day  levETIRAcetam 750 mg oral tablet: 1 tab(s) orally every 12 hours x 4 days  metFORMIN 500 mg oral tablet: 1 tab(s) orally 2 times a day

## 2024-09-24 NOTE — PROGRESS NOTE ADULT - ASSESSMENT
NEUROLOGICAL:  # acute subarachnoid hemorrhage  - Q1 neurovascular checks  - repeat CTH #2: increased SAH along the right anterior frontal convexity, new SAH along the left anterior convexity, new intraparenchymal hemorrhage, new thin SDH along left and probable right parietal convexities, similar SAH in right parietal calvarial fracture.   - repeat CTH #3 Increased size of right IPH, otherwise stable  - CTH #4 stable; Neurosurgery recs - no further Neurosurgical workup, q4h neuro checks. Keppra BID x 7 days total. Cleared for DVT ppx. Neurosurgery Sign off, reconsult PRN  - keppra 750mg bid x 7 days  - SBP < 140  #Acute pain  - APAP 650mg q6h PRN  - robaxin 500mg q8h for moderate pain   #acute EtOH intoxication  - CIWA, ativan 2mg IV prn for CIWA > 8  - Thiamine, folic acid     RESPIRATORY   #Oxygenation  - currently saturating well on room air, wean as tolerated  - encourage IS, pulling 2.5L  #Activity  - increase as tolerated    CARDS:   #PMH HTN  - no home medications  - SBP goal 110-140     - hydralazine 5mg IVP x 1 given 9/22 for     - amlodipine 5mg daily  #PMH HLD   - holding home gemfibrozil 600mg TID    GASTROINTESTINAL/NUTRITION:   #Diet, Clear Liquid Diet; Consistent Carbohydrate    -aspiration precautions, HOB 30  #Acute uncomplicated diverticulitis     -asymptomatic, no prior episodes, never had colonoscopy   - colonoscopy outpatient in 6-8 weeks   #GI Prophylaxis     -not indicated  #Bowel regimen    -none     -last bowel movement 9/21    /RENAL:   #urine output in critically ill    -voiding freely    Labs:   BUN/Cr- 9/0.7  -->,  10/0.7  -->  Electrolytes-(09-23 @ 00:32)Na 139 // K 3.8 // Mg 2.3 // Phos 2.8     #maintain euvolemia     -IV lock     -sodium goal 140-150  #hypophosphatemia   -repleted with 15mEq NaPhosp  #hypokalemia   - repleted with 20mEq potassium chloride       HEME/ONC:   #DVT prophylaxis     -Chemical: holding      -Mechanical: SCDs    Labs: Hb/Hct:  12.5/37.8  (09-22 @ 17:27)  -->,  12.0/36.1  (09-23 @ 00:32)  -->                      Plts:  206  -->,  199  -->                 PTT/INR:  32.9/1.22  --->,  31.4/1.18  --->     Blood Consent-obtain if acute anemia, q6 CBC    ID:  #monitor leukocytosis   - continue to monitor with daily labs   - monitor for fevers  #DTI   - no DTI seen on exam 9/21  #diverticulitis - asymptomatic  #UTI   - rocephin x 3 days  WBC- 9.36  --->>,  10.08  --->>,  10.40  --->>  Temp trend- 24hrs T(F): 99.4 (09-23 @ 00:00), Max: 100.4 (09-22 @ 03:30)  Current antibiotics- rocephin x 3 days    ENDOCRINE:  #PMH diabetes    -ISS   - blood glucose AC/HS    -Glucose goal 140-180.     -holding home metformin, glyburide, gemfibrozil    MSK:   - activity: ambulate as tolerated       LINES/DRAINS:  PIV    ADVANCED DIRECTIVES:  Full Code    HCP/Emergency Contact -    INDICATION FOR SICU: Q1 neurovascular checks in setting of acute intracranial bleed      DISPO: 4C    Case to be discussed with attending Dr. Skaggs   NEUROLOGICAL:  # acute subarachnoid hemorrhage  - Q1 neurovascular checks  - repeat CTH #2: increased SAH along the right anterior frontal convexity, new SAH along the left anterior convexity, new intraparenchymal hemorrhage, new thin SDH along left and probable right parietal convexities, similar SAH in right parietal calvarial fracture.   - repeat CTH #3 Increased size of right IPH, otherwise stable  - CTH #4 stable; Neurosurgery recs - no further Neurosurgical workup, q4h neuro checks. Keppra BID x 7 days total. Cleared for DVT ppx. Neurosurgery Sign off, reconsult PRN  - Keppra 750mg bid x 7 days  - SBP < 140  #Acute pain  - APAP 650mg q6h PRN  - Robaxin 500mg q8h for moderate pain   #acute EtOH intoxication  - CIWA, ativan 2mg IV prn for CIWA > 8  - Thiamine, folic acid     RESPIRATORY   #Oxygenation  - currently saturating well on room air, wean as tolerated  - encourage IS, pulling 2.5L  #Activity  - increase as tolerated    CARDS:   #PMH HTN  - no home medications  - SBP goal 110-140     - hydralazine 5mg IVP x 1 given 9/22 for     - amlodipine 5mg daily  #PMH HLD   - holding home gemfibrozil 600mg TID    GASTROINTESTINAL/NUTRITION:   #Diet, Clear Liquid Diet; Consistent Carbohydrate    -aspiration precautions, HOB 30  #Acute uncomplicated diverticulitis     -asymptomatic, no prior episodes, never had colonoscopy   - colonoscopy outpatient in 6-8 weeks   #GI Prophylaxis     -not indicated  #Bowel regimen    -none     -last bowel movement 9/21    /RENAL:   #urine output in critically ill    -voiding freely    Labs:   BUN/Cr- 9/0.7  -->,  10/0.7  -->  Electrolytes-(09-23 @ 00:32)Na 139 // K 3.8 // Mg 2.3 // Phos 2.8     #maintain euvolemia     -IV lock     -sodium goal 140-150  #hypophosphatemia   -repleted with 15mEq NaPhosp  #hypokalemia   - repleted with 20mEq potassium chloride       HEME/ONC:   #DVT prophylaxis     -Chemical: holding      -Mechanical: SCDs    Labs: Hb/Hct:  12.5/37.8  (09-22 @ 17:27)  -->,  12.0/36.1  (09-23 @ 00:32)  -->                      Plts:  206  -->,  199  -->                 PTT/INR:  32.9/1.22  --->,  31.4/1.18  --->     Blood Consent-obtain if acute anemia, q6 CBC    ID:  #monitor leukocytosis   - continue to monitor with daily labs   - monitor for fevers  #DTI   - no DTI seen on exam 9/21  #diverticulitis - asymptomatic  #UTI   - rocephin x 3 days  WBC- 9.36  --->>,  10.08  --->>,  10.40  --->>  Temp trend- 24hrs T(F): 99.4 (09-23 @ 00:00), Max: 100.4 (09-22 @ 03:30)  Current antibiotics- rocephin x 3 days    ENDOCRINE:  #PMH diabetes    -ISS   - blood glucose AC/HS    -Glucose goal 140-180.     -holding home metformin, glyburide, gemfibrozil    MSK:   - activity: ambulate as tolerated       LINES/DRAINS:  PIV    ADVANCED DIRECTIVES:  Full Code    HCP/Emergency Contact -    INDICATION FOR SICU: Q1 neurovascular checks in setting of acute intracranial bleed      DISPO: 4C    Case to be discussed with attending Dr. Schafer   NEUROLOGICAL:  # acute subarachnoid hemorrhage  - Q1 neurovascular checks  - repeat CTH #2: increased SAH along the right anterior frontal convexity, new SAH along the left anterior convexity, new intraparenchymal hemorrhage, new thin SDH along left and probable right parietal convexities, similar SAH in right parietal calvarial fracture.   - repeat CTH #3 Increased size of right IPH, otherwise stable  - CTH #4 stable; Neurosurgery recs - no further Neurosurgical workup, q4h neuro checks. Keppra BID x 7 days total. Cleared for DVT ppx. Neurosurgery Sign off, reconsult PRN  - Keppra 750mg bid x 7 days  #Acute pain  - APAP 650mg q6h PRN  - Robaxin 500mg q8h for moderate pain   #acute EtOH intoxication  - CIWA, ativan 2mg IV prn for CIWA > 8  - Thiamine, folic acid     RESPIRATORY   #Oxygenation  - currently saturating well on room air, wean as tolerated  - encourage IS, pulling 2.5L  #Activity  - increase as tolerated    CARDS:   #PMH HTN  - no home medications  - SBP goal 110-140     - hydralazine 5mg IVP x 1 given 9/22 for     - amlodipine 5mg daily  #PMH HLD   - holding home gemfibrozil 600mg TID    GASTROINTESTINAL/NUTRITION:   #Diet, Clear Liquid Diet; Consistent Carbohydrate    -aspiration precautions, HOB 30  #Acute uncomplicated diverticulitis     -asymptomatic, no prior episodes, never had colonoscopy   - colonoscopy outpatient in 6-8 weeks   #GI Prophylaxis     -not indicated  #Bowel regimen    -none     -last bowel movement 9/21    /RENAL:   #urine output in critically ill    -voiding freely    Labs:   BUN/Cr- 9/0.7  -->,  10/0.7  -->  Electrolytes-(09-23 @ 00:32)Na 139 // K 3.8 // Mg 2.3 // Phos 2.8     #maintain euvolemia     -IV lock     -sodium goal 140-150  #hypophosphatemia   -repleted with 15mEq NaPhosp  #hypokalemia   - repleted with 20mEq potassium chloride       HEME/ONC:   #DVT prophylaxis     -Chemical: holding      -Mechanical: SCDs    Labs: Hb/Hct:  12.5/37.8  (09-22 @ 17:27)  -->,  12.0/36.1  (09-23 @ 00:32)  -->                      Plts:  206  -->,  199  -->                 PTT/INR:  32.9/1.22  --->,  31.4/1.18  --->     Blood Consent-obtain if acute anemia, q6 CBC    ID:  #monitor leukocytosis   - continue to monitor with daily labs   - monitor for fevers  #DTI   - no DTI seen on exam 9/21  #diverticulitis - asymptomatic  #UTI   - rocephin x 3 days --> augmentin x 2 days outpatient   WBC- 9.36  --->>,  10.08  --->>,  10.40  --->>  Temp trend- 24hrs T(F): 99.4 (09-23 @ 00:00), Max: 100.4 (09-22 @ 03:30)  Current antibiotics- rocephin x 3 days    ENDOCRINE:  #PMH diabetes    -ISS   - blood glucose AC/HS    -Glucose goal 140-180.     -holding home metformin, glyburide, gemfibrozil    MSK:   - activity: ambulate as tolerated       LINES/DRAINS:  PIV    ADVANCED DIRECTIVES:  Full Code    HCP/Emergency Contact -    INDICATION FOR SICU: Q1 neurovascular checks in setting of acute intracranial bleed      DISPO: 4C/discharge    Case to be discussed with attending Dr. Schafer

## 2024-09-24 NOTE — DISCHARGE NOTE PROVIDER - CARE PROVIDER_API CALL
Suzanne Ny  Neurosurgery  [address] [place] [address], [address]  [place] [address]  Phone: [phone]  Fax: [phone]  Follow Up Time: 2 weeks

## 2024-09-26 ENCOUNTER — RESULT REVIEW (OUTPATIENT)
Age: 64
End: 2024-09-26

## 2024-09-26 PROBLEM — E11.9 TYPE 2 DIABETES MELLITUS WITHOUT COMPLICATIONS: Chronic | Status: ACTIVE | Noted: 2024-09-21

## 2024-10-01 ENCOUNTER — TRANSCRIPTION ENCOUNTER (OUTPATIENT)
Age: 64
End: 2024-10-01

## 2024-10-02 DIAGNOSIS — E78.5 HYPERLIPIDEMIA, UNSPECIFIED: ICD-10-CM

## 2024-10-02 DIAGNOSIS — F10.129 ALCOHOL ABUSE WITH INTOXICATION, UNSPECIFIED: ICD-10-CM

## 2024-10-02 DIAGNOSIS — S06.340A TRAUMATIC HEMORRHAGE OF RIGHT CEREBRUM WITHOUT LOSS OF CONSCIOUSNESS, INITIAL ENCOUNTER: ICD-10-CM

## 2024-10-02 DIAGNOSIS — E83.39 OTHER DISORDERS OF PHOSPHORUS METABOLISM: ICD-10-CM

## 2024-10-02 DIAGNOSIS — S02.0XXA FRACTURE OF VAULT OF SKULL, INITIAL ENCOUNTER FOR CLOSED FRACTURE: ICD-10-CM

## 2024-10-02 DIAGNOSIS — S06.5X0A TRAUMATIC SUBDURAL HEMORRHAGE WITHOUT LOSS OF CONSCIOUSNESS, INITIAL ENCOUNTER: ICD-10-CM

## 2024-10-02 DIAGNOSIS — Z23 ENCOUNTER FOR IMMUNIZATION: ICD-10-CM

## 2024-10-02 DIAGNOSIS — S06.6X0A TRAUMATIC SUBARACHNOID HEMORRHAGE WITHOUT LOSS OF CONSCIOUSNESS, INITIAL ENCOUNTER: ICD-10-CM

## 2024-10-02 DIAGNOSIS — N39.0 URINARY TRACT INFECTION, SITE NOT SPECIFIED: ICD-10-CM

## 2024-10-02 DIAGNOSIS — E11.9 TYPE 2 DIABETES MELLITUS WITHOUT COMPLICATIONS: ICD-10-CM

## 2024-10-02 DIAGNOSIS — E83.51 HYPOCALCEMIA: ICD-10-CM
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
## 2024-10-02 DIAGNOSIS — R40.2412 GLASGOW COMA SCALE SCORE 13-15, AT ARRIVAL TO EMERGENCY DEPARTMENT: ICD-10-CM

## 2024-10-02 DIAGNOSIS — E87.6 HYPOKALEMIA: ICD-10-CM

## 2024-10-02 DIAGNOSIS — E87.21 ACUTE METABOLIC ACIDOSIS: ICD-10-CM

## 2024-10-02 DIAGNOSIS — S06.350A TRAUMATIC HEMORRHAGE OF LEFT CEREBRUM WITHOUT LOSS OF CONSCIOUSNESS, INITIAL ENCOUNTER: ICD-10-CM
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
## 2024-10-02 DIAGNOSIS — K57.32 DIVERTICULITIS OF LARGE INTESTINE WITHOUT PERFORATION OR ABSCESS WITHOUT BLEEDING: ICD-10-CM

## 2024-10-02 DIAGNOSIS — E83.42 HYPOMAGNESEMIA: ICD-10-CM

## 2024-10-02 DIAGNOSIS — Z79.84 LONG TERM (CURRENT) USE OF ORAL HYPOGLYCEMIC DRUGS: ICD-10-CM

## 2024-10-11 ENCOUNTER — TRANSCRIPTION ENCOUNTER (OUTPATIENT)
Age: 64
End: 2024-10-11

## 2024-10-15 PROBLEM — Z00.00 ENCOUNTER FOR PREVENTIVE HEALTH EXAMINATION: Status: ACTIVE | Noted: 2024-10-15

## 2024-10-18 ENCOUNTER — APPOINTMENT (OUTPATIENT)
Dept: SURGERY | Facility: CLINIC | Age: 64
End: 2024-10-18

## 2024-10-18 VITALS
HEART RATE: 82 BPM | BODY MASS INDEX: 30.51 KG/M2 | OXYGEN SATURATION: 96 % | DIASTOLIC BLOOD PRESSURE: 80 MMHG | SYSTOLIC BLOOD PRESSURE: 132 MMHG | HEIGHT: 69 IN | WEIGHT: 206 LBS

## 2024-10-18 PROCEDURE — 99213 OFFICE O/P EST LOW 20 MIN: CPT

## 2024-10-19 ENCOUNTER — APPOINTMENT (OUTPATIENT)
Dept: NEUROSURGERY | Facility: CLINIC | Age: 64
End: 2024-10-19
Payer: COMMERCIAL

## 2024-10-19 DIAGNOSIS — S06.9XAA UNSPECIFIED INTRACRANIAL INJURY WITH LOSS OF CONSCIOUSNESS STATUS UNKNOWN, INITIAL ENCOUNTER: ICD-10-CM

## 2024-10-19 PROCEDURE — 99202 OFFICE O/P NEW SF 15 MIN: CPT

## 2024-10-22 ENCOUNTER — OUTPATIENT (OUTPATIENT)
Dept: OUTPATIENT SERVICES | Facility: HOSPITAL | Age: 64
LOS: 1 days | End: 2024-10-22
Payer: COMMERCIAL

## 2024-10-22 ENCOUNTER — APPOINTMENT (OUTPATIENT)
Dept: NEUROPSYCHOLOGY | Facility: CLINIC | Age: 64
End: 2024-10-22

## 2024-10-22 DIAGNOSIS — S06.0XAD CONCUSSION WITH LOSS OF CONSCIOUSNESS STATUS UNKNOWN, SUBSEQUENT ENCOUNTER: ICD-10-CM

## 2024-10-22 PROCEDURE — 96121 NUBHVL XM PHY/QHP EA ADDL HR: CPT

## 2024-10-22 PROCEDURE — 96116 NUBHVL XM PHYS/QHP 1ST HR: CPT

## 2024-10-23 DIAGNOSIS — S06.0XAD CONCUSSION WITH LOSS OF CONSCIOUSNESS STATUS UNKNOWN, SUBSEQUENT ENCOUNTER: ICD-10-CM

## 2024-11-12 ENCOUNTER — RESULT REVIEW (OUTPATIENT)
Age: 64
End: 2024-11-12

## 2024-11-12 ENCOUNTER — OUTPATIENT (OUTPATIENT)
Dept: OUTPATIENT SERVICES | Facility: HOSPITAL | Age: 64
LOS: 1 days | End: 2024-11-12
Payer: COMMERCIAL

## 2024-11-12 DIAGNOSIS — S06.9XAA UNSPECIFIED INTRACRANIAL INJURY WITH LOSS OF CONSCIOUSNESS STATUS UNKNOWN, INITIAL ENCOUNTER: ICD-10-CM

## 2024-11-12 DIAGNOSIS — Z00.8 ENCOUNTER FOR OTHER GENERAL EXAMINATION: ICD-10-CM

## 2024-11-12 PROCEDURE — 70450 CT HEAD/BRAIN W/O DYE: CPT

## 2024-11-12 PROCEDURE — 70450 CT HEAD/BRAIN W/O DYE: CPT | Mod: 26

## 2024-11-13 DIAGNOSIS — S06.9XAA UNSPECIFIED INTRACRANIAL INJURY WITH LOSS OF CONSCIOUSNESS STATUS UNKNOWN, INITIAL ENCOUNTER: ICD-10-CM

## 2024-11-18 ENCOUNTER — APPOINTMENT (OUTPATIENT)
Dept: NEUROSURGERY | Facility: CLINIC | Age: 64
End: 2024-11-18

## 2024-11-18 ENCOUNTER — OUTPATIENT (OUTPATIENT)
Dept: OUTPATIENT SERVICES | Facility: HOSPITAL | Age: 64
LOS: 1 days | End: 2024-11-18
Payer: COMMERCIAL

## 2024-11-18 ENCOUNTER — APPOINTMENT (OUTPATIENT)
Dept: NEUROPSYCHOLOGY | Facility: CLINIC | Age: 64
End: 2024-11-18

## 2024-11-18 DIAGNOSIS — G31.84 MILD COGNITIVE IMPAIRMENT OF UNCERTAIN OR UNKNOWN ETIOLOGY: ICD-10-CM

## 2024-11-18 DIAGNOSIS — S06.9XAA UNSPECIFIED INTRACRANIAL INJURY WITH LOSS OF CONSCIOUSNESS STATUS UNKNOWN, INITIAL ENCOUNTER: ICD-10-CM

## 2024-11-18 PROCEDURE — 96133 NRPSYC TST EVAL PHYS/QHP EA: CPT

## 2024-11-18 PROCEDURE — 96132 NRPSYC TST EVAL PHYS/QHP 1ST: CPT

## 2024-11-18 PROCEDURE — 99211 OFF/OP EST MAY X REQ PHY/QHP: CPT

## 2024-11-18 PROCEDURE — 96136 PSYCL/NRPSYC TST PHY/QHP 1ST: CPT

## 2024-11-18 PROCEDURE — 96137 PSYCL/NRPSYC TST PHY/QHP EA: CPT

## 2024-11-19 DIAGNOSIS — G31.84 MILD COGNITIVE IMPAIRMENT OF UNCERTAIN OR UNKNOWN ETIOLOGY: ICD-10-CM

## 2025-04-14 NOTE — CONSULT NOTE ADULT - SUBJECTIVE AND OBJECTIVE BOX
Pt did well over night no s/s of infection or sunburn sensation, a little bruising.  Aware it takes 2 weeks to notice pain relief and 4-6 weeks for full pain effect to be achieved.  Aware to medicate as previous for discomfort and may use ice or heat.  Confirmed next appt. Next RFA scheduled for 4/18/25   Call if any questions or concerns prior to next appt.   Current pain level - soreness   SUMMARY:HPI:  TRAUMA ACTIVATION LEVEL:  CONSULT  ACTIVATED BY: ED  INTUBATED: NO      MECHANISM OF INJURY:   [] Blunt     [] MVC	  [X] Fall	  [] Pedestrian Struck	      GCS: 15 	E: 4	V: 5	M: 6    HPI:    64yM w/ PMHx of DM and HLD seen as a trauma consult s/p fall down 5 stairs in his home after drinking wine +HT, -LOC, -AC.  According to patient's wife, patient had about 2-3 glasses of wine and went down to the basement to watch TV when she heard a loud bang. She found the patient at the bottom of the stairs lying on his back. He was unable to get up on his own so EMS was called. He does not complain of any pain at this time. Trauma assessment in ED: ABCs intact , GCS 15 , AAOx3.   On admission, patient had CTH and Cspine which showed skull fracture ans SAH. Trauma activated.  (22 Sep 2024 00:41)    ADMISSION SCORES:   NIHSS: 0   Allergies    No Known Allergies    Intolerances        REVIEW OF SYSTEMS: as per HPI      VITALS: [ ] Reviewed  Vital Signs Last 24 Hrs  T(C): 37 (21 Sep 2024 18:46), Max: 37 (21 Sep 2024 18:46)  T(F): 98.6 (21 Sep 2024 18:46), Max: 98.6 (21 Sep 2024 18:46)  HR: 90 (22 Sep 2024 00:45) (87 - 95)  BP: 156/72 (22 Sep 2024 00:45) (133/65 - 169/79)  BP(mean): --  RR: 17 (22 Sep 2024 00:45) (17 - 18)  SpO2: 97% (22 Sep 2024 00:45) (97% - 100%)    Parameters below as of 22 Sep 2024 00:45  Patient On (Oxygen Delivery Method): room air    CAPILLARY BLOOD GLUCOSE      POCT Blood Glucose.: 174 mg/dL (21 Sep 2024 18:55)      LABS:  PT/INR - ( 21 Sep 2024 20:58 )   PT: 13.50 sec;   INR: 1.18 ratio         PTT - ( 21 Sep 2024 20:58 )  PTT:33.1 sec  09-21    134[L]  |  100  |  12  ----------------------------<  191[H]  4.3   |  16[L]  |  0.9    Ca    9.0      21 Sep 2024 19:00    TPro  8.0  /  Alb  4.7  /  TBili  0.5  /  DBili  x   /  AST  44[H]  /  ALT  29  /  AlkPhos  76  09-21                          13.7   11.74 )-----------( 220      ( 21 Sep 2024 19:00 )             40.3       STROKE CORE MEASURES:      MEDICATION LEVELS:     IMAGING/DATA: [ ] Reviewed    IVF FLUIDS/MEDICATIONS: [ ] Reviewed  MEDICATIONS  (STANDING):    MEDICATIONS  (PRN):    I&O's Summary      EXAMINATION:  PHYSICAL EXAM:    Constitutional: No Acute Distress     Neurological: Awake, alert oriented to person, place and time, Following Commands, PERRL, EOMI, No Gaze Preference, Face Symmetrical, Speech Fluent, No dysmetria, No ataxia    Motor exam:          Upper extremity                         Delt     Bicep     Tricep    HG                                                 R         5/5        5/5        5/5       5/5                                               L          5/5        5/5        5/5       5/5          Lower extremity                        HF         KF        KE       DF         PF                                                  R        5/5        5/5        5/5       5/5         5/5                                               L         5/5        5/5       5/5       5/5          5/5                                                 Sensation: [ ] intact to light touch  [ ] decreased:     Reflexes: Deep Tendon Reflexes Intact     Pulmonary: Clear to Auscultation, No rales, No rhonchi, No wheezes     Cardiovascular: S1, S2, Regular rate and rhythm     Gastrointestinal: Soft    Extremities: No calf tenderness